# Patient Record
Sex: MALE | Race: WHITE
[De-identification: names, ages, dates, MRNs, and addresses within clinical notes are randomized per-mention and may not be internally consistent; named-entity substitution may affect disease eponyms.]

---

## 2018-08-27 NOTE — ED
Throat Pain/Nasal Congestion





- HPI Summary


HPI Summary: 


77-year-old male presents with a week's worth of right-sided jaw pain.  He 

states that the pain is worst when he chews.   He has been chewing more 

frequently. He states the pain seemed to radiate superficially around his ear.  

He denies any headache.  No dizziness.  No chest pain or shortness breath.  No 

weakness or numbness into his arms.  Denies any midline neck pain.  No injury.  

Pain is not worse with head movement.  He states that popping or locking in his 

jaw.  He did see his dentist last week for dental cleaning.  He denies any 

dental pain.  He does have follow-up with the dentist next week.  He has been 

taking Tylenol for his pain.  He states pain is worse when he talks.  Denies 

any change in vision or hearing.








- History of Current Complaint


Chief Complaint: EDNeckComplaint


Time Seen by Provider: 08/27/18 15:03





- Allergies/Home Medications


Allergies/Adverse Reactions: 


 Allergies











Allergy/AdvReac Type Severity Reaction Status Date / Time


 


No Known Allergies Allergy   Verified 08/27/18 15:37











Home Medications: 


 Home Medications





Acetaminophen TAB* [Tylenol TAB*] 325 mg PO Q4H PRN 08/27/18 [History Confirmed 

08/27/18]


Aspirin 81 mg CHEW TAB* [Aspirin Low Dose TAB*] 81 mg PO DAILY 08/27/18 [

History Confirmed 08/27/18]


Tamsulosin CAP* [Flomax CAP*] 0.4 mg PO DAILY 08/27/18 [History Confirmed 08/27/ 18]











PMH/Surg Hx/FS Hx/Imm Hx


Endocrine/Hematology History: 


   Denies: Hx Anticoagulant Therapy


Cardiovascular History: 


   Denies: Hx Hypertension





- Immunization History


Immunizations Up to Date: Yes


Infectious Disease History: No


Infectious Disease History: 


   Denies: Traveled Outside the [place] in Last 30 Days





- Family History


Known Family History: Positive: Cardiac Disease





- Social History


Alcohol Use: None


Substance Use Type: Reports: None


Smoking Status (MU): Former Smoker





Review of Systems


Negative: Fever


Positive: Other - right side jaw pain


Negative: Chest Pain


Negative: Shortness Of Breath


All Other Systems Reviewed And Are Negative: Yes





Physical Exam


Triage Information Reviewed: Yes


Vital Signs On Initial Exam: 


 Initial Vitals











Temp Pulse Resp BP Pulse Ox


 


 98.3 F   89   20   128/110   97 


 


 08/27/18 13:59  08/27/18 13:59  08/27/18 13:59  08/27/18 13:59  08/27/18 13:59











Vital Signs Reviewed: Yes


Appearance: Positive: Well-Appearing


Skin: Positive: Warm, Dry


Head/Face: Positive: Normal Head/Face Inspection, TMJ Tenderness - right.  

Negative: Temporal Artery Tenderness, Scalp


Eyes: Positive: Normal, EOMI, REVA, Conjunctiva Clear


ENT: Positive: Normal ENT inspection, Pharynx normal, Nasal drainage


Dental: Positive: Gross Decay/Caries @ - throughout


Neck: Positive: Supple, Nontender, No Lymphadenopathy, Other: - tenderness 

greatest over TMJ when opens jaw and feel popping present


Respiratory/Lung Sounds: Positive: Clear to Auscultation, Breath Sounds Present


Cardiovascular: Positive: Normal, RRR


Musculoskeletal: Positive: Strength/ROM Intact - neck and upper extremities, 

Other - good  strength


Neurological: Positive: Normal, Reflexes Intact - biceps


Psychiatric: Positive: Normal





Diagnostics





- Vital Signs


 Vital Signs











  Temp Pulse Resp BP Pulse Ox


 


 08/27/18 13:59  98.3 F  89  20  128/110  97














- Laboratory


Lab Statement: Any lab studies that have been ordered have been reviewed, and 

results considered in the medical decision making process.





EENT Course/Dx





- Course


Course Of Treatment: 77-year-old male presents with a week's worth of right-

sided jaw pain.  He states that the pain is worst when he chews.   He has been 

chewing more frequently. He states the pain seemed to radiate superficially 

around his ear.  He denies any headache.  No dizziness.  No chest pain or 

shortness breath.  No weakness or numbness into his arms.  Denies any midline 

neck pain.  No injury.  Pain is not worse with head movement.  He states that 

popping or locking in his jaw.  He did see his dentist last week for dental 

cleaning.  He denies any dental pain.  He does have follow-up with the dentist 

next week.  He has been taking Tylenol for his pain.  He states pain is worse 

when he talks.  Denies any change in vision or hearing.  On exam has no 

tenderness over the temporal artery.  Normal neuro exam.  Tenderness over TMJ.  

Pain with movement of jaw.  No focal deficits.  Do not suspect dissection or 

temporal arterities.  Belly pain is likely due to TMJ.  We'll have follow-up 

with dentist and primary.  Patient understands and agrees with plan.





- Differential Diagnoses


Differential Diagnoses: Temporal Arteritis, TMJ Syndrome, Other - disecction





- Diagnoses


Provider Diagnoses: 


 Jaw pain








Discharge





- Sign-Out/Discharge


Documenting (check all that apply): Patient Departure





- Discharge Plan


Condition: Good


Disposition: HOME


Patient Education Materials:  Temporomandibular Disorder (ED)


Referrals: 


Bruce Woods MD [Primary Care Provider] - 


Additional Instructions: 


eat softer foods


Take acetaminophen every 6 hours as needed for pain


Follow up with dentist as scheduled


Follow up with primary within 5 days as blood pressure is elevated at this visit


Return to ED if develop any new or worsening symptoms





- Billing Disposition and Condition


Condition: GOOD


Disposition: Home

## 2018-09-08 NOTE — RAD
Indication: Right facial droop.



Contrast: Administered 80.1 ml of OMNIPAQUE 350 mg/ml



CTA of the neck and head was performed after IV contrast administration. Coronal and

sagittal reconstructed images were obtained.



The great vessel and aortic arch demonstrates a common origin of left common carotid

artery and innominate artery. The common carotid artery bilaterally are grossly

unremarkable. Minimal plaque is noted at the origin of the left internal carotid arteries.

The right internal carotid artery demonstrates no significant plaque. No evidence of

carotid artery dissection is noted.



Vertebral arteries are patent. Dominant left vertebral artery is noted.



Intracranial circulation demonstrates no evidence of branch occlusion. No evidence of

aneurysmal dilatation is noted.



Vertebral artery, basilar artery and posterior cerebral arteries demonstrate no evidence

of branch occlusion or aneurysmal dilatation.



The visualized soft tissues of the neck demonstrates no evidence of abnormal adenopathy.

Submandibular glands are grossly unremarkable. No focal masses are identified.



IMPRESSION: Atherosclerosis of the origin of the left internal carotid artery. No

significant carotid artery stenosis is noted. No evidence of carotid artery dissection is

noted.



Intracranial circulation demonstrates no evidence of aneurysmal dilatation or branch

occlusion.
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
## 2018-09-08 NOTE — ED
Neurological HPI





- HPI Summary


HPI Summary: 


This patient is a 77 year old M presenting to [facility] with a chief complaint of a 

neurological deficit since 30 hours ago. Patient reports right sided jaw pain (

10 days ago), neck pain, right facial droop (30 hours ago), slurred speech (

yesterday), and difficulty putting a spoon in his mouth (yesterday). Patient 

denies difficulty moving his right arm.





- History of Current Complaint


Chief Complaint: EDNeurologicalDeficit


Stated Complaint: RT SIDE FACIAL NUMBNESS


Time Seen by Provider: 09/08/18 12:04


Hx Obtained From: Patient


Onset/Duration: Sudden Onset, Started days ago - 30 hours ago, Still Present


Neurological Deficit Location: Generalized - Loss of coordination, Facial - 

Facial droop


Pain Intensity: 0


Associated Signs and Symptoms: Positive: Pain - Right sided jaw pain and neck 

pain, Impaired Speech - Slurred speech





- Allergy/Home Medications


Allergies/Adverse Reactions: 


 Allergies











Allergy/AdvReac Type Severity Reaction Status Date / Time


 


No Known Allergies Allergy   Verified 09/08/18 12:20











Home Medications: 


 Home Medications





Finasteride TAB* [Proscar TAB*] 5 mg PO DAILY 09/08/18 [History Confirmed 09/08/ 18]


Gabapentin 300 mg PO TID 09/08/18 [History Confirmed 09/08/18]











PMH/Surg Hx/FS Hx/Imm Hx


Endocrine/Hematology History: 


   Denies: Hx Anticoagulant Therapy


Cardiovascular History: 


   Denies: Hx Hypertension


Infectious Disease History: No


Infectious Disease History: 


   Denies: Traveled Outside the [place] in Last 30 Days





- Family History


Known Family History: Positive: Cardiac Disease





- Social History


Occupation: Retired


Lives: With Family


Alcohol Use: None


Substance Use Type: Reports: None


Smoking Status (MU): Former Smoker





Review of Systems


Positive: Other - Right sided jaw pain and neck pain. 


Neurological: Other - Right facial droop and difficulty 


Positive: Slurred Speech


All Other Systems Reviewed And Are Negative: Yes





Physical Exam





- Summary


Physical Exam Summary: 


General: well-appearing, no pain distress


Skin: warm, color reflects adequate perfusion, dry, No rash. 


Head: normal. Right facial droop which includes the forehead, and his right 

eyelid drifts open before the left eyelid and closes after the left eyelid. He 

has no sensation deficit on either side of the face. He has mild tenderness to 

palpation at the angle of the jaw, at the TMJ, in the right ear canal and right 

parietal and occipital area. 


Eyes: EOMI, REVA. Blind in right eye (chronic problem). 


ENT: normal, TM is normal


Neck: supple, nontender


Respiratory: CTA, breath sounds present


Cardiovascular: RRR


Abdomen: soft, nontender


Bowel: present


Musculoskeletal: normal, strength/ROM intact


Neurological: sensory/motor intact, A&O x3, GCS 15, No difficulty with speech.


Psychological: affect/mood appropriate


Triage Information Reviewed: Yes


Vital Signs On Initial Exam: 


 Initial Vitals











Temp Pulse Resp BP Pulse Ox


 


 97 F   88   16   172/121   97 


 


 09/08/18 11:53  09/08/18 11:53  09/08/18 11:53  09/08/18 11:53  09/08/18 11:53











Vital Signs Reviewed: Yes





Diagnostics





- Vital Signs


 Vital Signs











  Temp Pulse Resp BP Pulse Ox


 


 09/08/18 11:53  97 F  88  16  172/121  97














- Laboratory


Result Diagrams: 


 09/08/18 12:26





 09/08/18 12:26


Lab Statement: Any lab studies that have been ordered have been reviewed, and 

results considered in the medical decision making process.





- Radiology


  ** Chest X-Ray


Radiology Interpretation Completed By: Radiologist - 13:12. NO ACTIVE 

CARDIOPULMONARY DISEASE IS NOTED. ED Physician has reviewed this imaging report.





- CT


  ** Brain CT


CT Interpretation Completed By: Radiologist - 12:52. Central and cortical 

atrophy. No intracranial mass or hemorrhage is noted. ED Physician has reviewed 

this imaging report.





  ** Head CTA


CT Interpretation Completed By: Radiologist - 17:11. Atherosclerosis of the 

origin of the left internal carotid artery. No significant carotid artery 

stenosis is noted. No evidence of carotid artery dissection is noted. 

Intracranial circulation demonstrates no evidence of aneurysmal dilatation or 

branch occlusion. ED Physician has reviewed this imaging report.





- EKG


  ** 12:38


Cardiac Rate: NL - 90 BPM


EKG Rhythm: Sinus Rhythm


ST Segment: Normal


Ectopy: None





Course/Dx





- Course


Course Of Treatment: CONSULTED DR YUN, NEUROLOGY, WHO SAW THE PATIENT IN 

THE ED.  ADMIT HOSPITALIST.





- Diagnoses


Provider Diagnoses: 


 Left-sided Bell's palsy, Headache, Gait difficulty








- Physician Notifications


Discussed Care Of Patient With: Kayley Yun - Neurology


Time Discussed With Above Provider: 13:31


Instructed by Provider To: MD Will See In ED





Discharge





- Sign-Out/Discharge


Documenting (check all that apply): Patient Departure


All imaging exams completed and their final reports reviewed: Yes





- Discharge Plan


Condition: Stable


Disposition: ADMITTED TO [facility]





- Billing Disposition and Condition


Condition: STABLE


Disposition: Admitted to [facility]





- Attestation Statements


Document Initiated by Scribe: Yes


Documenting Scribe: Fadi Urbina


Provider For Whom Scribe is Documenting (Include Credential): Jair Hudson


Scribe Attestation: 


Fadi NUNO, scribed for Jair Hudson on 09/08/18 at 2120. 


Scribe Documentation Reviewed: Yes


Provider Attestation: 


The documentation as recorded by the Fadi pa accurately reflects the 

service I personally performed and the decisions made by me, Jair Hudson





Consult


Consult: 


15:27. Informed Laney Freeman MD, hospitalist, of the patient's symptoms 

and Dr. Yun's interpretation of the case. Dr. Freeman will admit the patient.
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
## 2018-09-08 NOTE — RAD
Indication: Facial droop.



CT of the brain performed without IV contrast.



Ventriculomegaly consistent with central atrophy is noted. No midline shift is noted.

Prominent extra-axial spaces are noted. There is no evidence of intracranial mass or

hemorrhage. No other high or low density lesions are identified.



Mastoid air cells and paranasal sinuses are otherwise unremarkable.



IMPRESSION: Central and cortical atrophy. No intracranial mass or hemorrhage is noted.

## 2018-09-08 NOTE — RAD
Indication: Right facial injury.



Single frontal view of the chest performed at 1245 hours was reviewed.



No prior study is available for comparison.



No mediastinal shift is noted. Heart is of normal size and configuration. Lung fields

appear clear.



IMPRESSION: NO ACTIVE CARDIOPULMONARY DISEASE IS NOTED.

## 2018-09-09 NOTE — PN
CC:  Dr. Bruce Woods *

 

PROGRESS NOTE:

 

DATE OF SERVICE:  09/09/18.

 

HISTORY:  Overnight Hernando Hernández noted that his headaches did improve with 
Tylenol and aspirin last night.  It is starting to return.  He thought like his 
face was firming up a little bit, but then this morning continues to notice 
difficulty with weakness.  He denies any new symptoms of change of vision, 
numbness, weakness of arms or legs.

 

MEDICATIONS:  Include:

1.  Acetaminophen 650 mg p.o. q.6 hours p.r.n. pain.

2.  Aspirin 81 mg p.o. daily.

3.  Gabapentin 900 mg p.o. t.i.d.

4.  Heparin 5000 units subcu q.8 hours.

5.  Morphine 2 mg IV q.2 hours.

6.  Hydralazine 10 mg IV q.2 hours p.r.n. systolic blood pressure greater than 
170.

7.  Amlodipine 10 mg p.o. daily.

8.  Vitamin B12 injections 1000 mcg IM daily.

9.  Proscar 5 mg p.o. daily.

 

ALLERGIES:  He has no known drug allergies.

 

PHYSICAL EXAMINATION:  On examination, his temperature was 98 degrees 
Fahrenheit. He had regular cardiac rhythm with a pulse of 99, respiratory rate 
was 16, his blood pressure was 142/98, last night it was as high as 168/111, 
and yesterday afternoon in the ER up to 184/120.  He was saturating 98% on room 
air.  He had a regular cardiac rhythm.  His lungs were clear to auscultation.  
There was no evidence of rash.  He was awake, alert, and where he was located 
was tired after at night with some decrease in sleep.  Had normal language 
function.  He is blind in his right eye and his left eye had full extraocular 
movements, full fields to confrontation.  He continues to have a right 
peripheral 7th nerve palsy in that his right forehead and face and eye closure 
all are affected, but he is able to completely close his right eye.  There was 
a right pronator drift.  He otherwise was strong in his upper and lower 
extremities with good finger-to-nose and heel-to- shin movements.  He was able 
to stand, walk with a walker with his feet 1 foot apart.

 

LABORATORY DATA:  Data includes troponin of 0.01, C-reactive protein 3.83, 
lipid profile with total cholesterol 150, LDL 94, triglycerides 131, HDL 29.8, 
his vitamin B12 level was 72, folate was 13.7, TSH 1.04.  A CTA of the brain 
and neck showed no evidence of dissection, occlusion or aneurysm.

 

IMPRESSION AND PLAN:  An 78-year-old gentleman with history of almost 1 month 
of progressive neck and head pain, now developing right facial weakness with 
further decline in gait.  Differential diagnosis is wide.  Given the unusual 
presentation of facial weakness over a long duration one must question 
underlying infection such as Lyme disease or underlying injury, which could 
have contributed.  MRI of the brain is pending for tomorrow.  Ischemic lesion 
is on differential diagnosis given the acute decline, which led to admission 
yesterday.  An MRI will give further information.  Echocardiogram is pending.  
He is on telemetry.  He is on aspirin and further evaluation and treatment of 
stroke will depend on results of studies.

 

In addition, he has been found to have profound decrease in B12 and is 
undergoing further workup with methylmalonic acid level, intrinsic factor.  He 
has been started on B12 supplementation.  He does admit his diet is poor.  This 
may be contributing to some of the cognitive decline and balance decline.

 

In the differential diagnosis was vasculitis, his sedimentation rate and C-
reactive protein were within normal limits making this less likely.  There was 
no evidence of vasculitis was seen on CTA.  His SABRINA is pending.

 

Possibility of infection contributing is raised and urinalysis is being sent as 
well as Lyme.

 

Overall, I am concerned about his instability and ability to function alone in 
the setting of cognitive change, decreased balance, weakness.  Accordingly, he 
needs not only physical therapy and occupational therapy evaluation, but also 
social work involvement.

 

Differential diagnosis does include an idiopathic Bell's palsy, however, it is 
an unusual presentation.  At this point, he would not be a candidate for 
antivirals given the length of symptoms.  We have held up on steroids given 
other differential diagnosis, which could result in steroids being 
contraindicated.  



Over 30 minutes was spent in direct face-to-face the patient's care.  We will 
have the Neurology team continue to follow with you.

 

 [id number]/[facility] #: [id number]

REBEKAH

## 2018-09-09 NOTE — ECHO
Patient:      DYANA CISSE

Med Rec#:     [id number]            :          1940          

Date:         2018            Age:          78y                 

Account#:     [id number]          Height:       185.4 cm / 73.0 in

Accession#:   [id number]           Weight:       98 kg / 216.0 lbs

Sex:          M                     BSA:          2.22

Room#:        433                   

Admit Date#:  2018          

Type:         Inpatient

 

Referring:    Paloma Meeks NP

Reading:      Estela Hobbs MD

Sonographer:  Mili Olvera RN RDCS

CC:           Bruce Woods MD

______________________________________________________________________

 

Transthoracic Echocardiogram

 

Indication:

CVA

BP:           142/98

HR:           78

Rhythm:       NSR with PVCs

 

Findings     

History:

MI, BPH 

 

Technical Comments:

The study is technically limited due to poor acoustic windows.  

 

Left Ventricle:

The left ventricle is not well visualized. The left ventricular chamber

size is normal. Septal wall hypertrophy is observed.grossly normal wall

motion. The estimated ejection fraction is 45-50%.  There is an E to A

reversal in the mitral valve flow pattern suggestive of diastolic

dysfunction. 

 

Left Atrium:

The left atrial chamber size is normal. 

 

Right Ventricle:

The right ventricle wall thickness is mildly increased. The right

ventricular cavity size is normal. The right ventricular global systolic

function is low normal. 

 

Right Atrium:

The right atrium is slightly dilated.  A patent foramen ovale is not

demonstrated by color Doppler. The imaging for the bubble study was

suboptimal and it cannot be interpreted. 

 

Aortic Valve:

The aortic valve leaflets are mildly thickened. There is no evidence of

aortic regurgitation. There is no evidence of aortic stenosis. 

 

Mitral Valve:

The mitral valve leaflets are mildly thickened. There is no evidence of

mitral regurgitation. There is no evidence of mitral stenosis. 

 

Tricuspid Valve:

The tricuspid valve structure is not well visualized. There is no

evidence of tricuspid valve regurgitation. There is no tricuspid

stenosis. 

 

Pulmonic Valve:

The pulmonic valve structure is not well visualized. 

 

Pericardium:

There is no significant pericardial effusion. A pericardial fat pad is

visualized. 

 

Aorta:

There is mild dilatation of the ascending aorta.at 3.7 cm. There is no

dilatation of the aortic arch. There is moderate dilatation of the

aortic root.at 4.2 cm. 

 

Pulmonary Artery:

The main pulmonary artery is not well visualized. 

 

Venous:

The inferior vena cava appears normal in size. There is a greater than

50% respiratory change in the inferior vena cava dimension. 

 

Contrast:

Normal saline was used as contrast for the bubble study.  Images 101 and

102. A total of 4 ml of diluted Definity was given IV to try to enhance

endocardial border definition. 

 

Conclusions

Septal wall hypertrophy is observed.grossly normal wall motion.

The estimated ejection fraction is 50%. 

There is an E to A reversal in the mitral valve flow pattern suggestive

of diastolic dysfunction.

The right ventricle wall thickness is mildly increased.

The right ventricular global systolic function is low normal.

The imaging for the bubble study was suboptimal and it cannot be

interpreted.

The aortic valve leaflets are mildly thickened with normal function.

There is mild dilatation of the ascending aorta.at 3.7 cm.

No prior echo to compare.

Consider TAZ if clinically indicated to evaluate for cardiopulmonary

shuting or other cardioembolic source.

 

Measurements     

Name                    Value         Normal Range            

RVDdMajor (2D)          3.1 cm        (2.2 - 4.4)             

RVAW (2D)               0.7 cm        (0.2 - 0.5)             

RAd ISD 4CH             5.2 cm        (3.4 - 4.9)             

RA (A4C)W               3.8 cm        (2.9 - 4.6)             

IVSd (2D)               1.5 cm        (0.6 - 1)               

LVPWd (2D)              1 cm          (0.6 - 1)               

LVIDd (2D)              4 cm          (3.6 - 5.4)             

LVIDs (2D)              3.5 cm        -                        

LV FS (2D)              14 %          (25 - 45)               

Aortic Annulus          2.5 cm        (1.4 - 2.6)             

Ao root diameter (2D)   4.2 cm        (2.1 - 3.5)             

Ascending Ao            3.7 cm        (2.1 - 3.4)             

Aortic arch             2.7 cm        (1.8 - 3.4)             

LA dimension (AP) 2D    3.4 cm        (2.3 - 3.8)             

LAd ISD 4CH             5.3 cm        (2.9 - 5.3)             

LA ISD 4CH W            4.3 cm        (2.5 - 4.5)             

 

Name                    Value         Normal Range            

LA ESV SP 4CH (A/L)     51 ml         -                        

LA ESV SP 2CH (A/L)     53 ml         -                        

LA ESV BP (A/L)         53 ml         -                        

LA ESV BP (A/L) index   23.6 ml/m2    -                        

LA ESV SP 4CH (MOD)     49 ml         -                        

LA ESV SP 2CH (MOD)     50 ml         -                        

 

Name                    Value         Normal Range            

MV E-wave Vmax          0.4 m/sec     -                        

MV deceleration time    143 msec      -                        

MV A-wave Vmax          0.92 m/sec    -                        

MV E:A ratio            0.44 ratio    -                        

LV septal e' Vmax       0.05 m/sec    -                        

LV lateral e' Vmax      0.07 m/sec    -                        

LV E:e' septal ratio    8 ratio       -                        

LV E:e' lateral ratio   5.7 ratio     -                        

 

Name                    Value         Normal Range            

AV Vmax                 1.1 m/sec     -                        

AV VTI                  20.3 cm       -                        

AV peak gradient        4.7 mmHg      -                        

AV mean gradient        2.8 mmHg      -                        

LVOT Vmax               0.79 m/sec    -                        

LVOT VTI                19.1 cm       -                        

LVOT peak gradient      2.5 mmHg      -                        

LVOT mean gradient      1.7 mmHg      -                        

RAKESH Vmax                0.72 m/sec    -                        

 

Name                    Value         Normal Range            

IVC diameter            1.4 cm        -                        

 

Name                    Value         Normal Range            

PV Vmax                 0.61 m/sec    -                        

 

Electronically signed by: Estela Hobbs MD on 2018 14:33:54

## 2018-09-09 NOTE — PN
Subjective


Date of Service: 09/09/18


Interval History: 





Pt noted progressive problems with unsteady gait for at least a month. R facial 

drop had been present x 1 week. Today pt denies pain





Objective


Active Medications: 








Acetaminophen (Tylenol Tab*)  650 mg PO Q6H PRN


   PRN Reason: PAIN


   Last Admin: 09/09/18 09:45 Dose:  650 mg


Amlodipine Besylate (Norvasc Tab*)  10 mg PO DAILY [facility]


   Last Admin: 09/09/18 09:46 Dose:  10 mg


Aspirin (Aspirin 81 Mg Chew Tab*)  81 mg PO DAILY [facility]


   Last Admin: 09/09/18 09:46 Dose:  81 mg


Cyanocobalamin (Vitamin B12 Inj *)  1,000 mcg IM DAILY [facility]


   Stop: 09/14/18 23:59


   Last Admin: 09/09/18 09:46 Dose:  1,000 mcg


Finasteride (Proscar Tab*)  5 mg PO DAILY [facility]


   Last Admin: 09/09/18 09:45 Dose:  5 mg


Gabapentin (Neurontin Cap(*))  900 mg PO TID [facility]


   Last Admin: 09/09/18 09:45 Dose:  900 mg


Heparin Sodium (Porcine) (Heparin Vial(*))  5,000 units SUBCUT Q8HR [facility]


   Last Admin: 09/09/18 05:50 Dose:  5,000 units


Hydralazine HCl (Apresoline Iv*)  10 mg IV SLOW PU Q2H PRN


   PRN Reason: SYSTOLIC BP GREATER THAN:


Morphine Sulfate (Morphine Inj ((Syringe))*)  2 mg IV Q2H PRN


   PRN Reason: PAIN


   Last Admin: 09/09/18 01:05 Dose:  2 mg


Nystatin (Nystatin Top Powder*)  1 applic TOPICAL TID [facility]








 Vital Signs - 8 hr











  09/09/18 09/09/18 09/09/18





  07:21 09:45 11:05


 


Temperature 97.9 F  98.1 F


 


Pulse Rate 107  99


 


Respiratory 20 16 16





Rate   


 


Blood Pressure 164/98  133/101





(mmHg)   


 


O2 Sat by Pulse 97  96





Oximetry   














  09/09/18





  11:34


 


Temperature 


 


Pulse Rate 


 


Respiratory 16





Rate 


 


Blood Pressure 





(mmHg) 


 


O2 Sat by Pulse 





Oximetry 











Oxygen Devices in Use Now: None


Appearance: 77 yo M in nAD, aAOx3


Eyes: No Scleral Icterus, - - R eye cloudy -chronic


Ears/Nose/Mouth/Throat: NL Teeth, Lips, Gums, Mucous Membranes Moist


Neck: NL Appearance and Movements; NL JVP, Trachea Midline


Respiratory: Symmetrical Chest Expansion and Respiratory Effort, Clear to 

Auscultation


Cardiovascular: NL Sounds; No Murmurs; No JVD, RRR


Abdominal: NL Sounds; No Tenderness; No Distention


Lymphatic: No Cervical Adenopathy


Extremities: No Edema, No Clubbing, Cyanosis


Skin: No Rash or Ulcers, No Nodules or Sclerosis


Neurological: Alert and Oriented x 3, - - R sided Bell's palsy, motor 5/5 b/l, 

wide unsteady gait


Result Diagrams: 


 09/08/18 12:26





 09/08/18 12:26





Assess/Plan/Problems-Billing


Assessment: 77 yo M with h/o indwelling Bagley x 1 month and worsening of 

unsteadiness noted to have R facial droop and R neck pain on presentation to ED 

that had been ongoing for at lest a week prior 











- Patient Problems


(1) Facial droop


Comment: appears to be due to Bell's palsy


Lyme testing pending


MRI ordered


due to onset of symptoms over a week ago there would be no clear benefit of tx 

with steroids or antivirals.


D/w Dr. Powers.


cont PT   





(2) Unsteady gait


Comment: Likely neuro symptom of severe Vit B12 defficiency


will start daily Vit B12 injections


cont PT


Likely diet related, but will check MMA level and anti-intrinsic factor 

antibody to r/o pernicious anemia   





(3) Bagley catheter in place


Comment: h/o retention x 1 month , cont finasteride   





(4) Vitamin B 12 deficiency


Comment: severe with neuro symptoms.


Tx as above.


Cont PT/OT -will gustavoley need STR   





(5) HTN (hypertension)


Comment: started on Norvasc   





(6) DVT prophylaxis


Comment: HSQ   


Status and Disposition: 


inpatient

## 2018-09-10 NOTE — RAD
Indication: Right facial weakness.



Sagittal and axial T1, axial T2, FLAIR, diffusion and susceptibility weighted images of

the brain were obtained.



Ventricular structures are midline. No midline shift is noted. There is central and

cortical atrophy noted. There is no restriction of diffusion on diffusion-weighted images.

The brainstem and posterior fossa are otherwise unremarkable.



FLAIR images demonstrate likely chronic microvascular change involving the left basal

ganglia and right occipital white matter. No other intracranial mass or hemorrhage is

noted. No evidence of susceptibility artifact is noted. The orbits and paranasal sinuses

are otherwise unremarkable.



IMPRESSION: Central and cortical atrophy with mild chronic ischemic White matter change.

No evidence of restriction of diffusion is noted. No acute changes are noted.

## 2018-09-10 NOTE — PN
NEUROLOGICAL FOLLOWUP NOTE:

 

DATE OF VISIT:  09/10/18

 

HISTORY:  This is an 78-year-old man being evaluated for his Bell's palsy that 
has been on for at least 10 days' time as well as his gait disorder.  It is 
unclear how long this has been going on, but 2 months ago his sister and family 
came to visit and he was walking well and went out to dinner with them.  Two 
weeks ago, he seemed unsteady in his apartment.  It was unclear how bad he was 
in the past.  In the hospital, he is in danger of falling.  It is unclear how 
long this has been going on for, but it is relatively recent development he has 
had.  He is blind in his right eye.  Longstanding, he has had some right jaw 
pain that is predates the apparent Bell's palsy.

 

MEDICATIONS:  Include:

1.  Lopressor 25 mg q.12 hours.

2.  Gabapentin 900 t.i.d.

3.  He is now on B12 1000 mcg IM daily at this point.

4.  Ceftriaxone.

5.  Aspirin 81 mg daily.

 

He has no other complaints.

 

PHYSICAL EXAMINATION:  Temperature 97.9, pulse 98, respiratory rate 16, blood 
pressure 153/107.  He is alert and oriented with normal speech and 
comprehension. He has a right Bell's palsy.  Vision, he is blind in the right 
eye that is chronic. He has normal eye findings on the left.  Finger to nose 
was intact.  He was quite unsteady and had a positive Romberg and a wide-based 
stance.  It is hard for him to walk and he could not walk without assistance.  
He had trace weakness throughout. He had a decrease vibration in his toes.  
Reflexes were 1+ in his arms, 1+ in his legs.  Ankle jerks were detectable but 
diminished.

 

DIAGNOSTIC STUDIES:  Reviewed his MRI scan, which showed diffuse atrophy and 
some mild white matter changes.  He had B12 level of 72.  His Lyme titer is 
still pending.  His sed rate is 14. Normal TSH and thyroid. He had 2+ leukocyte 
esterase.

 

Normal INR.

 

ASSESSMENT/PLAN:  I discussed with Dr. Freeman that it is unclear if his gait 
disturbance and balance issues are directly related to his Bell's palsy.  Once 
possible connection would be if he had something like De Jesus Patel variant to 
[place], however, his symptoms had been going on little bit long for 
this to be likely, but it is still.  The history is somewhat vague and is hard 
to know.  I will be getting nerve conduction study tomorrow a.m. and then 
further recommendations would follow that.  He does have diffuse atrophy on his 
MRI scan and some of his gait problems could be chronic, it is hard to know.  
Again, from his history, some of it could be centrally based.  Lyme studies are 
pending as well and we will follow up on that in addition.

 

Thank you for sharing his care.

 

 [id number]/CPS #: [id number]

REBEKAH

## 2018-09-10 NOTE — PN
Subjective


Date of Service: 09/10/18


Interval History: 





Pt feels well, no new complaints. continues to have R facial palsy. seen with 

his sister who is visiting


Noted to have asymptomatic 20 beats of V. tach today





Objective


Active Medications: 








Acetaminophen (Tylenol Tab*)  650 mg PO Q6H PRN


   PRN Reason: PAIN


   Last Admin: 09/10/18 07:36 Dose:  650 mg


Aspirin (Aspirin 81 Mg Chew Tab*)  81 mg PO DAILY [facility]


   Last Admin: 09/10/18 07:37 Dose:  81 mg


Cyanocobalamin (Vitamin B12 Inj *)  1,000 mcg IM DAILY [facility]


   Stop: 09/14/18 23:59


   Last Admin: 09/10/18 07:37 Dose:  1,000 mcg


Finasteride (Proscar Tab*)  5 mg PO DAILY [facility]


   Last Admin: 09/10/18 07:37 Dose:  5 mg


Gabapentin (Neurontin Cap(*))  900 mg PO TID [facility]


   Last Admin: 09/10/18 13:31 Dose:  900 mg


Heparin Sodium (Porcine) (Heparin Vial(*))  5,000 units SUBCUT Q8HR [facility]


   Last Admin: 09/10/18 13:32 Dose:  5,000 units


Hydralazine HCl (Apresoline Iv*)  10 mg IV SLOW PU Q2H PRN


   PRN Reason: SYSTOLIC BP GREATER THAN:


Ceftriaxone Sodium 1 gm/ (Sodium Chloride)  50 mls @ 200 mls/hr IVPB Q24H [facility]


   Last Admin: 09/09/18 18:27 Dose:  200 mls/hr


Metoprolol Tartrate (Lopressor Tab*)  25 mg PO Q12HR [facility]


Morphine Sulfate (Morphine Inj ((Syringe))*)  2 mg IV Q2H PRN


   PRN Reason: PAIN


   Last Admin: 09/09/18 01:05 Dose:  2 mg


Nystatin (Nystatin Top Powder*)  1 applic TOPICAL TID [facility]


   Last Admin: 09/10/18 13:32 Dose:  1 applic








 Vital Signs - 8 hr











  09/10/18 09/10/18 09/10/18





  07:34 07:35 08:00


 


Temperature 97.9 F  


 


Pulse Rate 98  


 


Respiratory 16 16 16





Rate   


 


Blood Pressure 153/107  





(mmHg)   


 


O2 Sat by Pulse 97  





Oximetry   














  09/10/18 09/10/18





  09:11 13:31


 


Temperature  


 


Pulse Rate  


 


Respiratory 16 16





Rate  


 


Blood Pressure  





(mmHg)  


 


O2 Sat by Pulse  





Oximetry  











Oxygen Devices in Use Now: None


Appearance: 79 yo M in nAD, AAOx3


Eyes: No Scleral Icterus, - - R cornea cludy


Ears/Nose/Mouth/Throat: NL Teeth, Lips, Gums, Mucous Membranes Moist


Neck: NL Appearance and Movements; NL JVP, Trachea Midline


Respiratory: Symmetrical Chest Expansion and Respiratory Effort, Clear to 

Auscultation


Cardiovascular: NL Sounds; No Murmurs; No JVD, RRR


Abdominal: NL Sounds; No Tenderness; No Distention, No Hepatosplenomegaly


Lymphatic: No Cervical Adenopathy


Extremities: No Edema, No Clubbing, Cyanosis


Skin: No Rash or Ulcers, No Nodules or Sclerosis


Neurological: Alert and Oriented x 3, - - R Hyde's palsy. Motor 5/5 b/l speech 

clear


Result Diagrams: 


 09/08/18 12:26





 09/10/18 06:29





Assess/Plan/Problems-Billing


Assessment: 79 yo M with h/o indwelling Bagley x 1 month and worsening of 

unsteadiness noted to have R facial droop and R neck pain on presentation to ED 

that had been ongoing for at lest a week prior 











- Patient Problems


(1) Facial droop


Comment: appears to be due to Bell's palsy


Lyme testing pending


MRI done , read pending


due to onset of symptoms over a week ago there would be no clear benefit of tx 

with steroids or antivirals.


D/w . EMG tomorrow


cont PT   





(2) Unsteady gait


Comment: Likely neuro symptom of severe Vit B12 defficiency


cont daily Vit B12 injections


cont PT


Likely diet related, but MMA level and anti-intrinsic factor antibody to r/o 

pernicious anemia pending   





(3) Bagley catheter in place


Comment: h/o retention x 1 month , cont finasteride   





(4) Vitamin B 12 deficiency


Comment: severe with neuro symptoms.


Tx as above.


Cont PT/OT -willneed STR   





(5) HTN (hypertension)


Comment: started on Norvasc, but continues to be tachycardic and now with one 

episode of V. tach. will start lopressor, d/c Norvasc. cont telem


Echo showed EF 45-50%   





(6) UTI (urinary tract infection)


Comment: cx pending


Ceftriaxone started


Bagley exchanged on 9/9/18   





(7) DVT prophylaxis


Comment: HSQ   


Status and Disposition: 


inpatient

## 2018-09-11 NOTE — CONS
CONSULTATION REPORT:

 

DATE OF CONSULT:  18

 

REQUESTING PHYSICIAN:  Dr. Freeman.

 

CONSULTING SERVICE:  Infectious Disease.

 

REASON FOR CONSULT:  Bell's palsy, aseptic meningitis.

 

IMPRESSION:

1.  A week of right peripheral cranial nerve VII palsy as well as spinal fluid 
profile that includes 90 white cells, mostly lymphocytes, protein 187, normal 
glucose.  He is constitutionally well, though for a couple of months this 
summer had decreased appetite and malaise.  He has had some gait disturbance as 
well and evidence of neuropathy.  His Lyme serology is positive.  He could have 
a cranial nerve VII palsy due to Lyme as well as a Lyme meningitis.  It is a 
little hard to get the time course on his systemic symptoms early in the summer 
to tie those in, but it is possible.  Other infectious considerations with his 
gait disturbance and CSF findings include neurosyphilis.

2.  Vitamin B12 deficiency with macrocytosis.

3.  Urinary retention and indwelling Bagley for the last 2 months.

 

RECOMMENDATIONS:

1.  Doxycycline 100 mg by mouth twice a day to cover Lyme.  The Lyme ANTIONE is 
positive. The Western blot is pending.  In the blood, he did have a VDRL and 
the spinal fluid sent.

2.  I will add an HIV antibody.

 

HISTORY OF PRESENT ILLNESS:  This is an 78-year-old man, admitted with a right 
cranial nerve VII palsy.  He was seen by Neurology.  He was started on 
ceftriaxone for possibility of urinary tract infection.  The urinalysis had 
shown blood and leukocyte esterase.  The urine culture came back negative.  He 
has had an MRI of the brain that showed some central and cortical atrophy with 
mild chronic ischemic white matter changes that did not enhance.  A Lyme ANTIONE 
was positive, Western blot pending.  C-reactive protein is 3, and his vitamin 
B12 was found to be low.  He had a nerve conduction study that was abnormal and 
prompted a lumbar puncture with results as noted above.  He does spend some 
time outdoors this time of year including around his house in the lawn, does 
have a cat, which is an outdoor cat and does sleep with him.  He has had no 
travel.

 

PAST MEDICAL HISTORY:

1.  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

2.  Benign prostatic hypertrophy with urinary retention and Bagley catheter.

3.  History of MI.

4.  Chronic pain.

5.  Left cataract.

6.  Right femur fracture and fixation.

7.  Status post cholecystectomy.

8.  Status post appendectomy.

 

MEDICATIONS:

1.  Tylenol.

2.  Aspirin.

3.  Vitamin B12 injection.

4.  Ceftriaxone 1 g a day.

5.  Finasteride.

6.  Gabapentin.

7.  Heparin subcutaneous injection.

8.  Metoprolol.

 

ALLERGIES:  No known drug allergies.

 

SOCIAL HISTORY:  He lives on [address] by himself.  He has a pet 
cat.  He is retired from [employer].  No recent travel or sick contacts.

 

FAMILY HISTORY:  No recurrent infections.  His mother  of a heart attack in 
her 80s and father  in his 50s of a heart attack.

 

REVIEW OF SYSTEMS:  All negative except as noted above to a 14-point review of 
systems.

 

PHYSICAL EXAM:  Vital Signs:  Temperature is 36, heart rate 60, respiratory 
rate 18, blood pressure 130/88, oxygen saturation 100% on room air.  In general
, he is awake, not in distress.  Neurologic:  He has decreased right nasolabial 
fold and right facial weakness including in the forehead.  There is no lower 
extremity clonus.  HEENT:  There is no conjunctival hemorrhage.  There is 
slight right-sided conjunctivitis.  There is no oropharyngeal ulceration.  Neck 
is supple without mass.  Heart is regular rate and rhythm without murmurs, rubs
, or gallops.  Lungs are clear to auscultation bilaterally.  Abdomen:  Soft, 
nontender, nondistended. There are bowel sounds present.  Skin:  There is no 
rash or splinter hemorrhage. Musculoskeletal:  There is no spinal tenderness to 
palpation.  No joint synovitis.

 

LABORATORY DATA:  White blood cell count 7, hemoglobin 16, platelets 382. 
Creatinine is 0.8.  CRP 3.8.

 

Please see impressions and recommendations as outlined above, which I have 
discussed with Dr. Freeman.

 

Thanks for asking me to see Mr. Hernández in consultation.

 

 [id number]/CPS #: [id number]

[facility]

## 2018-09-11 NOTE — RAD
HISTORY: r/o encephalitis



COMPARISONS: September 10, 2018 



TECHNIQUE: The following sequences were obtained of the head: Sagittal, axial, and coronal

T1-weighted images after contrast enhancement with a gadolinium-based intravenous contrast

agent. 



FINDINGS: 

The study is read in conjunction with the MRI of September 10, 2018.



White matter changes noted on the previous examination do not enhance. There is no

abnormal enhancement.



IMPRESSION: 

THE WHITE MATTER CHANGES NOTED ON THE PREVIOUS EXAMINATION DO NOT ENHANCE. THERE IS NO

ABNORMAL ENHANCEMENT ON THE CURRENT EXAMINATION.

## 2018-09-11 NOTE — PN
Subjective


Date of Service: 09/11/18


Interval History: 





Pt feels well, c/o occasional pain in R face. Today planned for LP





Objective


Active Medications: 








Acetaminophen (Tylenol Tab*)  650 mg PO Q6H PRN


   PRN Reason: PAIN


   Last Admin: 09/11/18 08:12 Dose:  650 mg


Aspirin (Aspirin 81 Mg Chew Tab*)  81 mg PO DAILY [facility]


   Last Admin: 09/11/18 08:14 Dose:  81 mg


Cyanocobalamin (Vitamin B12 Inj *)  1,000 mcg IM DAILY [facility]


   Stop: 09/14/18 23:59


   Last Admin: 09/11/18 08:12 Dose:  1,000 mcg


Finasteride (Proscar Tab*)  5 mg PO DAILY [facility]


   Last Admin: 09/11/18 08:13 Dose:  5 mg


Gabapentin (Neurontin Cap(*))  900 mg PO TID [facility]


   Last Admin: 09/11/18 08:13 Dose:  900 mg


Hydralazine HCl (Apresoline Iv*)  10 mg IV SLOW PU Q2H PRN


   PRN Reason: SYSTOLIC BP GREATER THAN:


Ceftriaxone Sodium 1 gm/ (Sodium Chloride)  50 mls @ 200 mls/hr IVPB Q24H [facility]


   Last Admin: 09/10/18 17:37 Dose:  200 mls/hr


Metoprolol Tartrate (Lopressor Tab*)  25 mg PO Q12HR [facility]


   Last Admin: 09/11/18 08:13 Dose:  25 mg


Morphine Sulfate (Morphine Inj ((Syringe))*)  2 mg IV Q2H PRN


   PRN Reason: PAIN


   Last Admin: 09/11/18 06:48 Dose:  2 mg


Nystatin (Nystatin Top Powder*)  1 applic TOPICAL TID [facility]


   Last Admin: 09/11/18 08:16 Dose:  1 applic








 Vital Signs - 8 hr











  09/11/18 09/11/18 09/11/18





  06:48 07:52 08:13


 


Temperature  98.0 F 


 


Pulse Rate  73 


 


Respiratory 16 16 16





Rate   


 


Blood Pressure  152/95 





(mmHg)   


 


O2 Sat by Pulse  98 





Oximetry   














  09/11/18 09/11/18





  08:16 08:51


 


Temperature  


 


Pulse Rate  


 


Respiratory 16 16





Rate  


 


Blood Pressure  





(mmHg)  


 


O2 Sat by Pulse  





Oximetry  











Oxygen Devices in Use Now: None


Appearance: 77 yo M in nAD, AAOx3


Eyes: No Scleral Icterus, - - R cornea cloudy 


Ears/Nose/Mouth/Throat: NL Teeth, Lips, Gums, Mucous Membranes Moist


Neck: NL Appearance and Movements; NL JVP, Trachea Midline


Respiratory: Symmetrical Chest Expansion and Respiratory Effort, Clear to 

Auscultation


Cardiovascular: NL Sounds; No Murmurs; No JVD, RRR


Abdominal: NL Sounds; No Tenderness; No Distention


Lymphatic: No Cervical Adenopathy


Extremities: No Edema, No Clubbing, Cyanosis


Skin: No Rash or Ulcers, No Nodules or Sclerosis


Neurological: Alert and Oriented x 3, - - R Hyde's palsy unchanged


Result Diagrams: 


 09/08/18 12:26





 09/10/18 06:29


Microbiology and Other Data: 


 Microbiology











 09/09/18 11:00 Urine Culture - Final





 Urine 














Assess/Plan/Problems-Billing


Assessment: 77 yo M with h/o indwelling Bagley x 1 month and worsening of 

unsteadiness noted to have R facial droop and R neck pain on presentation to ED 

that had been ongoing for at lest a week prior 











- Patient Problems


(1) Facial droop


Comment: appears to be due to Bell's palsy


Lyme testing pending


MRI- no CVA


due to onset of symptoms over a week ago there would be no clear benefit of tx 

with steroids or antivirals.


D/w . EMG shows b/l LE's neuropathy that may be related to B12 deff, 

but LP recommended to r/o GBS.


cont PT   





(2) Unsteady gait


Comment: Likely neuro symptom of severe Vit B12 defficiency


cont daily Vit B12 injections


cont PT


Likely diet related, but MMA level and anti-intrinsic factor antibody to r/o 

pernicious anemia pending   





(3) Bagley catheter in place


Comment: h/o retention x 1 month , cont finasteride   





(4) Vitamin B 12 deficiency


Comment: severe with neuro symptoms.


Tx as above.


Cont PT/OT - approved for STR   





(5) HTN (hypertension)


Comment: cont  lopressor. One episode of 20 beats of V. tach on 9/9/18


Echo showed EF 45-50%   





(6) UTI (urinary tract infection)


Comment: cx  shwos mixed jason, possible Bagley contamination


will d/c Ceftriaxone


Bagley exchanged on 9/9/18   





(7) DVT prophylaxis


Comment: HSQ   


Status and Disposition: 


inpatient

## 2018-09-12 NOTE — PN
Subjective


Date of Service: 09/12/18


Interval History: 





Pt stated that intermittent R facial pain that was present 2 days ago resolved. 

Feels overall better and stronger





Objective


Active Medications: 








Acetaminophen (Tylenol Tab*)  650 mg PO Q6H PRN


   PRN Reason: PAIN


   Last Admin: 09/11/18 23:43 Dose:  650 mg


Aspirin (Aspirin 81 Mg Chew Tab*)  81 mg PO DAILY [facility]


   Last Admin: 09/12/18 08:00 Dose:  81 mg


Cyanocobalamin (Vitamin B12 Inj *)  1,000 mcg IM DAILY [facility]


   Stop: 09/14/18 23:59


   Last Admin: 09/12/18 08:01 Dose:  1,000 mcg


Doxycycline Hyclate (Vibramycin Cap(*))  100 mg PO BID [facility]


   Last Admin: 09/12/18 07:59 Dose:  100 mg


Finasteride (Proscar Tab*)  5 mg PO DAILY [facility]


   Last Admin: 09/12/18 08:00 Dose:  5 mg


Gabapentin (Neurontin Cap(*))  900 mg PO TID [facility]


   Last Admin: 09/12/18 14:24 Dose:  900 mg


Heparin Sodium (Porcine) (Heparin Vial(*))  5,000 units SUBCUT Q8HR [facility]


   Last Admin: 09/12/18 14:24 Dose:  5,000 units


Hydralazine HCl (Apresoline Iv*)  10 mg IV SLOW PU Q2H PRN


   PRN Reason: SYSTOLIC BP GREATER THAN:


Metoprolol Tartrate (Lopressor Tab*)  25 mg PO Q12HR [facility]


   Last Admin: 09/12/18 07:58 Dose:  25 mg


Morphine Sulfate (Morphine Inj ((Syringe))*)  2 mg IV Q2H PRN


   PRN Reason: PAIN


   Last Admin: 09/12/18 07:48 Dose:  2 mg


Nystatin (Nystatin Top Powder*)  1 applic TOPICAL TID [facility]


   Last Admin: 09/12/18 14:26 Dose:  Not Given








 Vital Signs - 8 hr











  09/12/18 09/12/18 09/12/18





  10:18 11:28 14:24


 


Temperature  97.6 F 


 


Pulse Rate  58 


 


Respiratory 16 16 16





Rate   


 


Blood Pressure  128/81 





(mmHg)   


 


O2 Sat by Pulse  95 





Oximetry   














  09/12/18





  15:22


 


Temperature 97.5 F


 


Pulse Rate 76


 


Respiratory 20





Rate 


 


Blood Pressure 121/85





(mmHg) 


 


O2 Sat by Pulse 98





Oximetry 











Oxygen Devices in Use Now: None


Appearance: 77 yo M in nAD, aAOx3


Eyes: No Scleral Icterus, - - R cornea cloudy


Ears/Nose/Mouth/Throat: NL Teeth, Lips, Gums, Mucous Membranes Moist


Neck: NL Appearance and Movements; NL JVP, Trachea Midline


Respiratory: Symmetrical Chest Expansion and Respiratory Effort


Cardiovascular: NL Sounds; No Murmurs; No JVD


Abdominal: NL Sounds; No Tenderness; No Distention


Lymphatic: No Cervical Adenopathy


Extremities: No Edema, No Clubbing, Cyanosis


Skin: No Rash or Ulcers, No Nodules or Sclerosis


Neurological: Alert and Oriented x 3, - - R Hyde's palsy


Result Diagrams: 


 09/08/18 12:26





 09/10/18 06:29


Microbiology and Other Data: 


 Microbiology











 09/09/18 11:00 Urine Culture - Final





 Urine 














Assess/Plan/Problems-Billing


Assessment:





78 year old gentleman with a history of likely Bell's Palsy on the right, gait 

instability, urinary retention





- Patient Problems


(1) Facial droop


Comment: appears to be due to Bell's palsy


Lyme testing positive, but Western blot pending. Started on doxy on 9/11/18 and 

prior to that was treated x 3 days with Ceftriaxone


MRI with and without contrast - no CVA


EMG shows b/l LE's neuropathy that may be related to B12 deff, but LP 

recommended to r/o GBS. CSF shows 90 WBC and increased protein. VDRL, 

Cryptococcus antigen , Lyme serology on CSF pending. As per d/w Dr. Rose -

symtoms and findings may be Lyme related


HIV negative.


cont PT, likely d/c to [facility] on 9/13/18 with f/u with Dr. Hancock 

scheduled on 10/4/18   





(2) Unsteady gait


Comment: Likely neuro symptom of severe Vit B12 defficiency


cont daily Vit B12 injectionsx 7 days, then monthly. anti intrinsic factor 

positive, but it may be elevated due to concurrent Vit B12 injections. 

Recommended anti-intrinsic factor repeat 2 weeks after Vit B12 injection in the 

future to r/o pernicious anemia    





(3) Bagley catheter in place


Comment: h/o retention x 1 month , cont finasteride   





(4) Vitamin B 12 deficiency


Comment: severe with neuro symptoms.


Tx as above.


Cont PT/OT - approved for STR   





(5) HTN (hypertension)


Comment: cont  lopressor. One episode of 20 beats of V. tach on 9/9/18


Echo showed EF 45-50%. will d/c telem   





(6) UTI (urinary tract infection)


Comment: cx  shows mixed jason, possible Bagley contamination


 Ceftriaxone d/c'd


Bagley exchanged on 9/9/18   





(7) DVT prophylaxis


Comment: HSQ   


Status and Disposition: 


inpatient, awaiting remaining lab values from CSF  to come back. Likely d/c to 

[facility] on 9/13/18

## 2018-09-12 NOTE — PN
Subjective


Date of Service: 09/12/18


Length of Stay: 4 Days





Neurology is following for Bell's Palsy and Neuropathy with gait disturbance





Interval History: 





Overnight, no new issues reported.  Tolerated LP without incident.  Remains 

somewhat steady when walking.  No falls.  He is eating this am.  Denies any new 

neurologic symptoms.  No headache, no new vision changes, no new numbness, 

tingling or weakness.  "I feel alright."


Review of Systems: 


*****


Denied CP, SOB, or palpitations, headaches, new vision changes (chronic right 

vision loss), N/V.  Eating well.  Bagley in place








Objective


Active Medications: 








Acetaminophen (Tylenol Tab*)  650 mg PO Q6H PRN


   PRN Reason: PAIN


   Last Admin: 09/11/18 23:43 Dose:  650 mg


Aspirin (Aspirin 81 Mg Chew Tab*)  81 mg PO DAILY [facility]


   Last Admin: 09/12/18 08:00 Dose:  81 mg


Cyanocobalamin (Vitamin B12 Inj *)  1,000 mcg IM DAILY [facility]


   Stop: 09/14/18 23:59


   Last Admin: 09/12/18 08:01 Dose:  1,000 mcg


Doxycycline Hyclate (Vibramycin Cap(*))  100 mg PO BID [facility]


   Last Admin: 09/12/18 07:59 Dose:  100 mg


Finasteride (Proscar Tab*)  5 mg PO DAILY [facility]


   Last Admin: 09/12/18 08:00 Dose:  5 mg


Gabapentin (Neurontin Cap(*))  900 mg PO TID [facility]


   Last Admin: 09/12/18 07:56 Dose:  900 mg


Heparin Sodium (Porcine) (Heparin Vial(*))  5,000 units SUBCUT Q8HR [facility]


   Last Admin: 09/12/18 05:15 Dose:  5,000 units


Hydralazine HCl (Apresoline Iv*)  10 mg IV SLOW PU Q2H PRN


   PRN Reason: SYSTOLIC BP GREATER THAN:


Metoprolol Tartrate (Lopressor Tab*)  25 mg PO Q12HR [facility]


   Last Admin: 09/12/18 07:58 Dose:  25 mg


Morphine Sulfate (Morphine Inj ((Syringe))*)  2 mg IV Q2H PRN


   PRN Reason: PAIN


   Last Admin: 09/12/18 07:48 Dose:  2 mg


Nystatin (Nystatin Top Powder*)  1 applic TOPICAL TID [facility]


   Last Admin: 09/12/18 08:04 Dose:  1 applic








 Vital Signs











  09/11/18 09/11/18 09/11/18





  08:51 11:53 13:30


 


Temperature  97.7 F 97.0 F


 


Pulse Rate  62 59


 


Respiratory 16 20 18





Rate   


 


Blood Pressure  134/89 129/88





(mmHg)   


 


O2 Sat by Pulse  100 100





Oximetry   














  09/11/18 09/11/18 09/11/18





  14:29 14:55 17:28


 


Temperature   


 


Pulse Rate   


 


Respiratory 16 16 16





Rate   


 


Blood Pressure   





(mmHg)   


 


O2 Sat by Pulse   





Oximetry   














  09/11/18 09/11/18 09/11/18





  17:43 19:41 20:00


 


Temperature 98.3 F 97.7 F 


 


Pulse Rate 77 82 


 


Respiratory 20 20 18





Rate   


 


Blood Pressure 145/97 134/93 





(mmHg)   


 


O2 Sat by Pulse 98 95 





Oximetry   














  09/11/18 09/11/18 09/12/18





  22:24 23:10 00:30


 


Temperature  97.5 F 


 


Pulse Rate  71 


 


Respiratory 18 18 18





Rate   


 


Blood Pressure  150/97 





(mmHg)   


 


O2 Sat by Pulse  97 





Oximetry   














  09/12/18 09/12/18 09/12/18





  00:37 01:39 03:42


 


Temperature   97.5 F


 


Pulse Rate   67


 


Respiratory 18 18 20





Rate   


 


Blood Pressure   138/84





(mmHg)   


 


O2 Sat by Pulse   99





Oximetry   














  09/12/18 09/12/18 09/12/18





  07:21 07:48 07:56


 


Temperature 98.0 F  


 


Pulse Rate 65  


 


Respiratory 16 18 18





Rate   


 


Blood Pressure 152/102  





(mmHg)   


 


O2 Sat by Pulse 98  





Oximetry   











Intake and Output Last 24 Hours











 09/10/18 09/11/18 09/12/18 09/13/18





 06:59 06:59 06:59 06:59


 


Intake Total 1370 990 865 


 


Output Total 1275 1035 1375 


 


Balance 95 -45 -510 


 


Weight  206 lb 8 oz 206 lb 8 oz 


 


Intake:    


 


  IV Fluids 50   


 


    ABX - CEFTRIAXONE 50   


 


  Oral 1320 990 865 


 


Output:    


 


  Bagley 1275 1035 1375 


 


Other:    


 


  # Bowel Movements 1 0 0 


 


  Estimated Stool Amount Large   











Oxygen Devices in Use Now: None


Neurology Exam: 





General: 





HEENT: Normocephalic/atraumatic, sclera anicteric, mucous membranes moist





Neck: Supple





Chest: Clear to auscultation bilaterally 





Cardiovascular: Regular rate and rhythm without murmurs, rubs, gallops





Abdomen: Soft, nontender/nondistended





Extremities: No clubbing, cyanosis, or edema





Skin:  Warm and dry








Neurological Findings: 





Awake, Alert, Oriented x3





Speech: fluent without dysarthria, repetition intact





Cranial Nerve: PEERL, EOM intact, Marked vision loss in the right eye, no 

nystagmus on the left, weakness in the upper and lower face on the right, 

facial sensation intact, hearing intact to finger rub bilaterally, palate 

elevates symmetrically, tongue midline





Motor: 5/5 throughout, proximal and distal extremities x4 tone/bulk normal.  No 

focal weakness, no fasciculation appreciated





Sensation: Diminished to LT/PP in the feet to shins, with reduced PP in the 

hands





Deep Tendon Reflex: 1+ symmetric in the upper extremities, 2+ at the patella, 1

+ at the ankles, equivocal Babinski 





Finger to nose, rapid alternating movements intact without tremor, no 

dysdiadochokinesia





Gait: Unsteady, wide based








Result Diagrams: 


 09/08/18 12:26





 09/10/18 06:29


Microbiology and Other Data: 


 Microbiology











 09/09/18 11:00 Urine Culture - Final





 Urine 














Assessment/Plan


Assessment:





78 year old gentleman with a history of likely Bell's Palsy on the right, gait 

instability, urinary retention, Lyme WB positive.  ID following, now on 

Doxycycline for presumed Lyme infection.  EMG/NCS showed a sensory motor 

neuropathy with long tract findings, concerning for a possible inflammatory 

neuropathy, CSF with 90 WBC suggestive of an inflammatory/infectious disease.  

B12 low, on replacement. MRI w/wo shows no enhancement.  Chronic changes.  A 

unifying diagnosis remains cryptic.





1.  Concern for Lyme disease:  Continue Doxycycline: ID following.  S/P LP: 

Follow up studies.  Unlikely neurosyphilis but follow up VDRL.  Follow up 

cytology.  Follow up HIV serology.


2.  Unlikely AIDP/CIDP, reflexes preserved, consider MRI L/S spine


3.  B12 deficiency:  posterior column?  On replacement. Workup in progress


4.  PT





Will continue to follow

## 2018-09-12 NOTE — PN
PROGRESS NOTE:

 

DATE OF VISIT:  09/11/18

 

PATIENT OF:  Dr. Freeman.

 

HISTORY:  This is a neurological followup on this 78-year-old man with recent 
onset of gait disturbance and Bell's palsy and some facial pain.  His symptoms 
are unchanged today from yesterday.  He still has his Bell's palsy and gait 
disturbance.

 

MEDICATIONS:  His medications continued to be:

1.  Metoprolol 25 mg q.12 hours.

2.  Heparin q.8 hours subcu.

3.  Gabapentin 900 t.i.d.

4.  Finasteride 5 mg daily.

5.  Aspirin 81 mg daily.

6.  He is getting B12 injections.

 

PHYSICAL EXAMINATION:  On exam, temperature 97, pulse 59, respirations 16, 
blood pressure 129/88.  He is alert and oriented with normal speech and 
comprehension. Cranial nerves II through XII were abnormal for his right Bell's 
palsy.  He is also blind in his right eye chronically.  Motor exam revealed 
normal tone and strength, but is wide based unsteady gait.  Brisk reflexes.  
Chest:  Clear.  Cardiovascular: Regular rate and rhythm.  Abdomen:  Soft with 
positive bowel sounds.

 

DIAGNOSTIC STUDIES:  I performed an EMG nerve conduction study on in today 
given his recent onset of gait disturbance and this showed a sensory motor 
peripheral neuropathy worse in the legs than in the arms, but with long tract 
being worse than more distal nerve conduction studies in the arms.  This raised 
the issue of polyradiculopathy and because of that I asked Dr. Freeman to obtain a 
spinal tap, which showed 90 white cells with 90% lymphs, 10% monos.  Total 
protein 187, glucose of 60.  Specialized tests including cytology, VDRL, Lyme 
titers are pending.  His SABRINA is negative.  His intrinsic factor antibody is 
positive.  His Lyme disease serology is positive.  This is Western Blot 
pending.  I obtained an MRI scan with contrast on and after his positive CSF 
around his brainstem because his findings could be suggestive of brainstem 
meningitis with his Bell's palsy but is polyradiculopathy.  I reviewed this and 
this was negative.

 

ASSESSMENT AND PLAN:  Mr. Hernández is a complicated case.  His elevated white 
count and CSF would not be consistent with purely inflammatories, 
polyradiculopathy such as Guillain-Tacoma or chronic demyelinating 
polyradiculopathy.  It suggests either more of an autoimmune or infectious 
process and in addition to adding cryptococcal antigen.  We have asked Dr. Rose to see and give his input.  It is possible that his Bell's palsy is 
secondary to Lyme disease and he may have a Lyme meningitis.  His peripheral 
neuropathy may be independent and secondary to his low B12, but this remains to 
be seen as possible that he despite is negative MRI scan with contrast he could 
considerably have a carcinomatous meningitis and we await the CSF cytology.

 

Thank you for sharing his case.

 

 [id number] #: [id number]

REBEKAH
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
## 2018-10-01 NOTE — ED
Syncope/Near Syncope





- HPI Summary


HPI Summary: 





An 77 y/o M presents to ED s/p syncopal episode which spontaneously resolved 

onset PTA. Patient was seated talking to someone, but prior to that, had been 

had physical therapy for his legs. Associated sx: mild dizziness and tiredness 

prior to episode; pale. Denies: HA, CP, SOB. Patient states feeling OK at 

bedside. PMHx: Lyme dz. 








- History Of Current Complaint


Chief Complaint: EDSyncope


Time Seen by Provider: 10/01/18 11:51


Hx Obtained From: Patient


Onset/Duration: Sudden Onset, Resolved


Activity At Onset: At Rest


Alleviating Factor(s): Spontaneous Resolution


Associated Signs And Symptoms: Dizzy - mild, Other - pos: mild tiredness. neg: 

CP, SOB, HA





- Allergies/Home Medications


Allergies/Adverse Reactions: 


 Allergies











Allergy/AdvReac Type Severity Reaction Status Date / Time


 


No Known Allergies Allergy   Verified 09/08/18 12:20














PMH/Surg Hx/FS Hx/Imm Hx


Previously Healthy: No


Endocrine/Hematology History: 


   Denies: Hx Anticoagulant Therapy, Hx Diabetes


Cardiovascular History: 


   Denies: Hx Hypertension, Hx Pacemaker/ICD


   Comment Only: Other Cardiovascular Problems/Disorders - MI


Respiratory History: 


   Denies: Hx Asthma, Hx Chronic Obstructive Pulmonary Disease (COPD), Hx Lung 

Cancer, Hx Pulmonary Embolism


GI History: 


   Denies: Hx Gastroesophageal Reflux Disease


 History: Reports: Hx Benign Prostatic Hyperplasia


   Comment Only: Other  Problems/Disorders - urine retention


Musculoskeletal History: Reports: Hx Back Problems


Sensory History: Reports: Hx Contacts or Glasses, Hx Legally Blind - Rt eye, Hx 

Hearing Problem


   Denies: Hx Cataracts, Hx Eye Injury, Hx Eye Prosthesis, Hx Glaucoma, Hx 

Macular Degeneration, Hx Deafness, Hx Hearing Aid


Opthamlomology History: Reports: Hx Contacts or Glasses, Hx Legally Blind - Rt 

eye


   Denies: Hx Cataracts, Hx Eye Injury, Hx Eye Prosthesis, Hx Glaucoma, Hx 

Macular Degeneration


Neurological History: Reports: Hx Headaches


Psychiatric History: 


   Denies: Hx Panic Disorder





- Surgical History


Surgery Procedure, Year, and Place: stents in heart, tonsils, appendix, hernia 

x9, gallbladder, right ankle, right femur


Infectious Disease History: No


Infectious Disease History: 


   Denies: Hx Clostridium Difficile, Hx Hepatitis, Hx of Known/Suspected MRSA, 

Hx Shingles, Hx Tuberculosis, Hx Known/Suspected VRE, Traveled Outside the [place] 

in Last 30 Days





- Family History


Known Family History: Positive: Cardiac Disease





- Social History


Occupation: Retired


Lives: Assisted Living


Alcohol Use: None


Substance Use Type: Reports: None


Smoking Status (MU): Former Smoker





Review of Systems


Constitutional: Other - paleness 


Positive: Other - pos: mild tiredness


Negative: Chest Pain


Negative: Shortness Of Breath


Neurological: Other - pos: mild dizziness


Positive: Syncope.  Negative: Headache


All Other Systems Reviewed And Are Negative: Yes





Physical Exam





- Summary


Physical Exam Summary: 





VITAL SIGNS: Reviewed. 


GENERAL:  Patient is a well-developed and nourished MALE who is lying 

comfortable in the stretcher.  Patient is not in any acute respiratory 

distress. 


HEAD AND FACE: No signs of trauma.  No ecchymosis, hematomas or skull 

depressions. No sinus tenderness. 


EYES: PERRLA, EOMI x 2, No injected conjunctiva, no nystagmus.


EARS: Hearing grossly intact. Ear canals and tympanic membranes are within 

normal limits. 


MOUTH: Oropharynx within normal limits. 


NECK: Supple, trachea is midline, no adenopathy, no JVD, no carotid bruit, no c-

spine tenderness, neck with full ROM.


CHEST: Symmetric, no tenderness at palpation 


LUNGS: Clear to auscultation bilaterally. No wheezing or crackles.


CVS: Regular rate and rhythm, S1 and S2 present, no murmurs or gallops 

appreciated. 


ABDOMEN: Soft, non-tender. No signs of distention. No rebound, no guarding, and 

no masses palpated. Bowel sounds are normal. 


EXTREMITIES: FROM in all major joints, no edema, no cyanosis or clubbing.


NEURO: Alert and oriented x 3. No acute neurological deficits. Speech is normal 

and follows commands.


SKIN: Dry and warm





Triage Information Reviewed: Yes


Vital Signs On Initial Exam: 


 Initial Vitals











Temp Pulse Resp BP Pulse Ox


 


 97.6 F   58   24   104/69   95 


 


 10/01/18 11:46  10/01/18 11:46  10/01/18 11:46  10/01/18 11:46  10/01/18 11:46











Vital Signs Reviewed: Yes





- Ortiz Coma Scale


Best Eye Response: 4 - Spontaneous


Best Motor Response: 6 - Obeys Commands


Best Verbal Response: 5 - Oriented


Coma Scale Total: 15





Diagnostics





- Vital Signs


 Vital Signs











  Temp Pulse Resp BP Pulse Ox


 


 10/01/18 11:46  97.6 F  58  24  104/69  95














- Laboratory


Result Diagrams: 


 10/02/18 04:58





 10/02/18 05:00


Lab Statement: Any lab studies that have been ordered have been reviewed, and 

results considered in the medical decision making process.





- Radiology


  ** CXR


Xray Interpretation: No Acute Changes - IMPRESSION: No evidence for acute dz. 

ED provider has reviewed this report.


Radiology Interpretation Completed By: Radiologist





- CT


  ** BRAIN CT


CT Interpretation: No Acute Changes - IMPRESSION: 1. NO EVIDENCE FOR ACUTE 

INTRACRANIAL ABNORMALITY. 2. ATROPHY. ED provider has reviewed this report.


CT Interpretation Completed By: Radiologist





- EKG


  ** 1157


Cardiac Rate: Bradycardia - 58 bpm


EKG Rhythm: Sinus Bradycardia


ST Segment: Normal - no ST elevation





Course/Dx


Assessment/Plan: An 77 y/o M presents to ED s/p syncopal episode which 

spontaneously resolved onset PTA. Patient was seated talking to someone, but 

prior to that, had been had physical therapy for his legs. Associated sx: mild 

dizziness and tiredness prior to episode; pale. Denies: HA, CP, SOB. Patient 

states feeling OK at bedside. PMHx: Lyme dz.  Blood work without any 

significant abnormality, troponin and d-dimer are negative.  Head CT impression

: No acute interconnected pathology.  Chest x-ray impression no acute 

pathology.  Because of the syncopal episode with positive loss of consciousness 

and he is comorbidities I discuss my physical exam and findings with Dr. Harris 

from the hospitalist services who accepted the patient for admission.  At this 

point the patient is hemodynamically stable alert oriented 3.





- Diagnoses


Differential Diagnosis/HQI/PQRI: Positive: Cerebral Vascular Accident, Coronary 

Artery Disease, Dysrhythmia, Seizure, Transient Ischemic Attack, Vasovagal 

Episode


Provider Diagnoses: 


 Syncope








- Physician Notifications


Discussed Care of Patient With: Obed Harris - hospitalist


Time Discussed With Above Provider: 13:32


Instructed by Provider To: Admit As Inpatient





Discharge





- Sign-Out/Discharge


Documenting (check all that apply): Patient Departure - ADM





- Discharge Plan


Condition: Stable


Disposition: ADMITTED TO [facility]





- Billing Disposition and Condition


Condition: STABLE


Disposition: Admitted to [facility]





- Attestation Statements


Document Initiated by Scribe: Yes


Documenting Scribe: Amarjit Estrada


Provider For Whom Scribe is Documenting (Include Credential): Dr. Jose Suárez MD


Scribe Attestation: 


I, Amarjit Estrada, scribed for Dr. Jose Suárez MD on 10/02/18 at 0826. 


Scribe Documentation Reviewed: Yes


Provider Attestation: 


The documentation as recorded by the scribe, Amarjit Estrada accurately 

reflects the service I personally performed and the decisions made by me, Dr. Jose Suárez MD

## 2018-10-01 NOTE — CONS
CARDIOLOGY CONSULTATION:

 

DATE OF CONSULT:  10/01/18

 

INDICATION FOR CONSULTATION:  Syncope.

 

HISTORY OF PRESENT ILLNESS:  The patient is an 78-year-old gentleman with a 
history of urinary retention, recent history of Lyme disease with possible 
meningitis, history of coronary artery disease, who was at rehab today and had 
a syncopal episode.  The patient was in the hospital earlier this month with 
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  He was sent to rehab for strength 
training.  The patient did his morning exercises with a physical therapist, was 
back at his room resting when he had a witnessed syncopal episode.  The patient 
was sitting down at that time, the patient simply just slumped forward.  I do 
not have any other details of his episode.  The patient was transported to the 
emergency room.  On arrival in the emergency room, the patient had sinus 
bradycardia at 51 beats per minute, no evidence of any high-degree AV block, no 
other arrhythmias.  The patient was admitted to the hospital for observation.

 

The patient himself does not remember the incident.  He just remembers people 
rushing up to him after it had occurred.  The patient had no seizure activity 
that was documented.

 

PAST MEDICAL HISTORY:  Significant for right eye blindness secondary to 
infection, benign prostatic hypertrophy, history of coronary artery disease.

 

PAST SURGICAL HISTORY:  Cataract surgery, cholecystectomy, appendectomy.

 

OUTPATIENT MEDICATIONS:

1.  Ceftriaxone 1 g daily.

2.  Finasteride.

3.  Gabapentin.

4.  Metoprolol tartrate 25 mg b.i.d.

 

ALLERGIES:  No known drug allergies.

 

FAMILY HISTORY:  His mother  of a heart attack in her 80s.  Father  in 
his 50s of a heart attack.

 

SOCIAL HISTORY:  The patient had previously been living by himself on [facility], but is currently at rehab.  He is retired from [employer].  He 
denies tobacco or alcohol use.

 

REVIEW OF SYSTEMS:  Positive for right-sided facial weakness.  Positive for 
syncope.  Negative for changes in bowel or bladder habits.  The patient does 
have an indwelling Bagley catheter.  He denies any fevers or chills.

 

PHYSICAL EXAM:  Height is 6 feet 3 inches, weight 208 pounds, temperature 98.1, 
heart rate of 60, blood pressure 128/78, respiratory rate is 18, oxygen 
saturation 94% on room air.  Sclerae anicteric.  Oropharynx is pink without 
erythema. Carotids are 2+ without bruits.  JVD is normal.  Thyroid is normal.  
Cardiac Exam: S1, S2 without any murmurs, rubs, or gallops.  Lungs are clear to 
auscultation bilaterally.  There is no dullness to percussion.  Abdomen is soft
, nontender, nondistended with normoactive bowel sounds.  Extremities show no 
edema.  He has 2+ pulses throughout.  The patient is awake, alert, and 
oriented.  He moves all 4 extremities equally.

 

DIAGNOSTIC STUDIES/LAB DATA:  Echocardiogram on 18 demonstrates ejection 
fraction of 45% to 50%, no focal wall motion abnormalities, no significant 
valvular abnormalities.

 

Laboratory studies:  Chemistries within normal limits.  BUN 18, creatinine 0.9. 
AST and ALT are normal.  Troponin is negative.  TSH 2.6.  CBC within normal 
limits.

 

EKG demonstrates sinus bradycardia with T-wave flattening.

 

IMPRESSION AND PLAN:  The patient is an 78-year-old gentleman with recent 
history of Lyme meningitis, who was admitted to the hospital with a syncopal 
episode.  The patient's initial EKG showed sinus bradycardia, but no evidence 
of AV block.

 

For now, my recommendation is to stop his beta-blocker.  The patient will 
continue on telemetry overnight.  The patient may benefit from long-term 
outpatient monitoring.  I do not think any other cardiac testing is necessary.

 

 [id number] #: [id number]

[facility]

## 2018-10-01 NOTE — RAD
INDICATION:  Syncope.



COMPARISON:  Comparison is made with a prior study from September 08, 2018.



TECHNIQUE: A portable view of the chest was obtained.



FINDINGS: Cardiac and mediastinal contours appear to be within normal limits.



The lungs are clear. No pleural effusion is seen.



IMPRESSION:  NO EVIDENCE FOR ACUTE DISEASE.

## 2018-10-01 NOTE — HP
CC:  Dr. Woods; Dr. Rose; Dr. Moreno *

 

HISTORY AND PHYSICAL:

 

DATE OF ADMISSION:  10/01/18

 

PRIMARY CARE PROVIDER:  Dr. Woods.

 

ATTENDING PHYSICIAN WHILE IN THE HOSPITAL:  Michael Harris MD * (report 
dictated by Anil Sood NP).

 

CONSULTING INFECTIOUS DISEASE SPECIALIST:  Dr. Rose.

 

CONSULTING CARDIOLOGIST:  Dr. Moreno.

 

CHIEF COMPLAINT:  Syncope.

 

HISTORY OF PRESENT ILLNESS:  Mr. Hernández is an 78-year-old male patient, who 
was recently seen here in the hospital, was admitted, found to have Bell palsy 
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  He was discharged 
about a month ago to [facility].  He was undergoing physical therapy there.  He 
unfortunately today was doing his [facility] physical therapy session, he 
completed the session, he never once had chest pain or shortness of breath.  He 
walked up and down the unit twice at [facility].  He went back to his room, was 
sitting in the chair waiting for his second session, and the next thing he knew 
he woke up and he was surrounded by people around him.  He denied any chest 
pain prior to or after this event.  He says he just felt tired.  He felt 
fatigued.  He denied feeling palpitations or fluttering in his chest.  He 
denies any recent fevers or chills.  He denies having any abdominal pain or any 
nausea or vomiting or diarrhea.  He was being evaluated again by therapy.  He 
finished the physical therapy.  Because of the syncopal episode, he came in.  
He does carry a history of BPH with a chronic Bagley, Bell palsy, Lyme meningitis
, history of MI, polyneuropathy history.  He is legally blind in his right eye.
  He has a history of CAD.  He is on beta-blockers, but there has been no 
change in medications with the exception that they have added on Tylenol and he 
has been on doxycycline for the Lyme.  He came in to the ED today for syncope.  
It was noted when he came in that on his initial EKG, he was bradycardic in the 
50s but because of the syncope, we were asked to evaluate for admission.

 

PAST MEDICAL HISTORY:  Significant for:

1.  Bell palsy.

2.  Lyme meningitis.

3.  BPH.

4.  MI.

5.  Chronic pain.

6.  Polyneuropathy.

7.  Legally blind in the right eye.

8.  CAD.

 

PAST SURGICAL HISTORY:

1.  He has had a heart catheterization and 1 sent according to the patient.

2.  He has had appendectomy.

3.  He has had cholecystectomy.

 

HOME MEDICATIONS:  According to the list that he provided, includes:

1.  Travatan 1 drop right eye q.p.m.

2.  Timolol 1 drop right eye b.i.d.

3.  Flomax 0.4 mg daily.

4.  Pilocarpine 1 drop left eye as directed.

5.  Prilosec 20 mg daily.

6.  Nystatin powder 1 application topically t.i.d.

7.  Lopressor 25 mg p.o. every 12 hours.

8.  Milk of magnesia 30 cc p.o. daily.

9.  Gabapentin 900 mg p.o. t.i.d.

10.  Proscar 5 mg daily.

11.  Doxycycline 100 mg p.o. b.i.d.

12.  B12 1000 mcg IM monthly.

13.  Tessalon 100 mg p.o. t.i.d. as needed.

14.  Aspirin 81 mg daily.

15.  Tylenol 650 mg every 6 hours as needed for pain.

 

ALLERGIES TO MEDICATIONS:  Include no known drug allergies.

 

FAMILY HISTORY:  Both his parents had MI.

 

SOCIAL HISTORY:  He does not smoke.  He does not drink.  He resides at [facility] 
currently.  Surrogate decision maker is his sister and his friend.

 

REVIEW OF SYSTEMS:  There is no documented fever.  He denies having any 
significant weight change.  There is no double vision.  He denies having any 
ear discharge. There is no rhinorrhea.  There is no sore throat, no thyroid 
enlargement.  Denied having any chest pain.  There is no orthopnea.  There is 
no nocturnal dyspnea. There was a loss of consciousness.  There is no pruritus, 
and there is no skin ulceration.  Review of 14 systems completed, all others 
negative.

 

                               PHYSICAL EXAMINATION

 

GENERAL:  Mr. Hernández is an 78-year-old male patient.  He is sitting in the ED 
stretcher.  He does not appear to be in any acute distress.

 

VITAL SIGNS:  Blood pressure 104/64, pulse is 58, respirations were 24, O2 sat 
95%, temperature 97.6.

 

HEENT:  Head:  Atraumatic, normocephalic.  Eyes:  EOMs are intact.  Sclerae 
anicteric and not pale.  Throat:  Oral mucosa appears to be moist.  No 
oropharyngeal erythema.

 

NECK:  Supple.

 

LUNGS:  Clear to auscultation bilaterally.  There are no wheezes, rales, or 
rhonchi.

 

HEART:  Sounds S1, S2.  He had a regular rate and rhythm.  He has currently 
heart rate in the mid 60.  There are no murmurs, rubs, or gallops.

 

ABDOMEN:  Soft, it was flat, it was nontender.  Bowel sounds were present.

 

EXTREMITIES:  Pulses are 2+ throughout.  He is moving all 4 extremities with 5/
5 strength.

 

NEUROLOGIC:  The patient is awake.  He is alert.  He is oriented x3.  His 
tongue is midline.  His  are equal.  He has a facial droop to the right 
side baseline. No new deficits 

 

SKIN:  Intact.

 

 DIAGNOSTIC STUDIES/LAB DATA:  Labs today reveal WBC of 9.2, RBC of 4.07, 
hemoglobin 13.7, hematocrit of 40, his platelet count was 240,000.  INR 0.95.  D
-dimer less than 100.  Sodium 136, potassium 4.3, chloride 106, bicarb 26, BUN 
18, creatinine 0.95, glucose 95, lactate 1.8, calcium 9, mag 2.0.  Total bili 
1.4, AST 19, ALT 31, alk phos 75.  Troponin 0.00.  Ammonia of 31.  TSH normal.  
Albumin of 3.8.  Toxicology negative.  UA is pending.

 

He had a brain CT obtained today.  Brain CT showed no evidence for acute 
intracranial abnormality, atrophy.

 

Chest x-ray showed no evidence for acute disease.

 

EKG shows a sinus bradycardia, rate of 58.  No ST elevations or T-wave 
inversions were noted.  We have reviewed to his previous EKG, the rate is 
different.  Today, he is bradycardic but there are no significant changes from 
previous EKGs with exception of the rate.  His AL today was noted to be 0.17.

 

He just had an echo done less than a month ago, which showed EF of 45% to 50%.  
No prior echo for compare.

 

He had a head CTA as well, which showed atherosclerosis at the origin in the 
left internal carotid artery, no significant carotid artery stenosis.  Old 
medical records reviewed.

 

ASSESSMENT AND PLAN:  Mr. Heránndez is an 78-year-old male patient coming into 
the ED today with complaints of a syncopal episode.  We were asked to evaluate 
for admission.  He will be admitted under observation status for:

 

1.  Syncope.  At this point, etiology is unclear.  I am concerned for 
arrhythmia particularly in the setting of recent Lyme.  I did place a consult 
with Dr. Moreno. I also placed a consult with Dr. Rose.  I am going to go 
ahead and put the patient on 2 g of IV Rocephin for the time being.  We may 
need to consider longer event monitoring.  I am holding his beta-blockers.  We 
will get orthostatic blood pressures.  I am holding the patient's beta-blockers 
per the recommendations of Cardiology given the setting of recent syncope and I 
will continue to monitor him on telemetry.

2.  Bell palsy.  Continue current medical regimen.  Follow with Neurology.

3.  History of Lyme meningitis.  He will be placed on Rocephin.

4.  Benign prostatic hypertrophy.  Continue Bagley and Proscar and finasteride.

5.  History of myocardial infarction and coronary artery disease.  Continue the 
aspirin for the time being and holding beta-blockers per recommendations of 
Cardiology.

6.  Neuropathy.  Continue his gabapentin.

7.  History of right eye blindness.  Continue his eyedrops with the exception 
of timolol particularly in the setting of bradycardia.

8.  DVT prophylaxis.  He will be placed on heparin subcu.

9.  Code status.  He is a DNR.

10.  Fluids, electrolytes, and nutrition.  He can have a heart healthy diet.

 

TIME SPENT:  On the admission was 60 minutes, greater than half of the time was 
spent face-to-face with the patient, obtaining my history and physical, the 
other half of the time was spent going over the plan of care with the patient 
and implementing the plan of care.

 

I did discuss plan of care with my attending, Dr. Harris; he is in agreement.

 

 ____________________________________ ANIL SOOD, RAMANA

 

[id number] #: [id number]

REBEKAH

## 2018-10-01 NOTE — RAD
INDICATION:  Syncope.



COMPARISON:  Comparison is made with a prior CT of the brain from September 08, 2018.



TECHNIQUE: Contiguous axial sections of the brain were obtained from the skull base to the

vertex without contrast.



FINDINGS: The ventricles, cisterns and sulci are enlarged consistent with diffuse atrophy.

 No significant focal abnormality or mass effect is seen. There is no evidence for

hemorrhage.



No significant focal osseous abnormality is seen. There is a small round density

visualized in the posterior aspect of the right maxillary sinus measuring 8 mm in size

most consistent with a mucous retention cyst or polyp. The visualized portion of the

paranasal sinuses and mastoid air cells otherwise appear clear.



IMPRESSION:

1. NO EVIDENCE FOR ACUTE INTRACRANIAL ABNORMALITY.

2. ATROPHY.

## 2018-10-02 NOTE — PN
Progress Note





- Progress Note


Date of Service: 10/02/18


Note: 





I spoke to a staff member at [facility].  She stated the patient had his Bagley 

changed in the ED 9/22 or 9/23.

## 2018-10-03 NOTE — PN
Subjective


Date of Service: 10/02/18


Interval History: 





No new c/o.  





Objective


Active Medications: 








Acetaminophen (Tylenol Tab*)  650 mg PO Q4H PRN


   PRN Reason: FEVER/PAIN


   Last Admin: 10/02/18 00:25 Dose:  650 mg


Aspirin (Aspirin 81 Mg Chew Tab*)  81 mg PO DAILY [facility]


   Last Admin: 10/03/18 08:57 Dose:  81 mg


Benzonatate (Tessalon Cap*)  100 mg PO TID PRN


   PRN Reason: COUGH


Doxycycline Hyclate (Vibramycin Cap(*))  100 mg PO BID [facility]


   Last Admin: 10/03/18 08:56 Dose:  100 mg


Finasteride (Proscar Tab*)  5 mg PO DAILY [facility]


   Last Admin: 10/03/18 08:57 Dose:  5 mg


Gabapentin (Neurontin Cap(*))  900 mg PO TID [facility]


   Last Admin: 10/03/18 08:56 Dose:  900 mg


Heparin Sodium (Porcine) (Heparin Vial(*))  5,000 units SUBCUT Q8HR [facility]


   Last Admin: 10/03/18 06:00 Dose:  5,000 units


Latanoprost (Xalatan 0.005%*)  1 drop RIGHT EYE 2100 [facility]


   Last Admin: 10/02/18 20:43 Dose:  1 drop


Metoprolol Tartrate (Lopressor Tab*)  12.5 mg PO Q12HR [facility]


   Last Admin: 10/03/18 08:58 Dose:  12.5 mg


Omeprazole (Prilosec Cap*)  20 mg PO DAILY [facility]


   Last Admin: 10/03/18 09:00 Dose:  20 mg


Pilocarpine HCl (Pilocarpine 1% Opth.Sol*)  1 drop LEFT EYE Q30D [facility]


   Last Admin: 10/01/18 20:25 Dose:  1 drop


Tamsulosin HCl (Flomax Cap*)  0.4 mg PO DAILY [facility]


   Last Admin: 10/03/18 08:56 Dose:  0.4 mg








 Vital Signs - 8 hr











  10/03/18 10/03/18 10/03/18





  07:33 08:56 11:17


 


Temperature 98.2 F  97.8 F


 


Pulse Rate 64  72


 


Respiratory 20 20 20





Rate   


 


Blood Pressure 112/77  114/65





(mmHg)   


 


O2 Sat by Pulse 98  98





Oximetry   














  10/03/18 10/03/18





  11:27 12:00


 


Temperature  97 F


 


Pulse Rate  68


 


Respiratory 20 16





Rate  


 


Blood Pressure  101/69





(mmHg)  


 


O2 Sat by Pulse  100





Oximetry  











Oxygen Devices in Use Now: None


Appearance: Alert, in a chair.  Somewhat irritable but otherwise looks 

comfortable.


Eyes: No Scleral Icterus


Respiratory: Symmetrical Chest Expansion and Respiratory Effort, Clear to 

Auscultation, Clear to Percussion


Cardiovascular: NL Sounds; No Murmurs; No JVD, No Edema, - - rapid but regular


Extremities: No Edema, No Clubbing, Cyanosis, -


Skin: No Rash or Ulcers, No Nodules or Sclerosis, -


Neurological: Alert and Oriented x 3, NL Sensation, NL Gait, NL Muscle Strength 

and Tone, -


Result Diagrams: 


 10/03/18 06:46





 10/02/18 05:00





Assess/Plan/Problems-Billing


Assessment: 











- Patient Problems


(1) Syncope


Current Visit: Yes   Status: Acute   Code(s): R55 - SYNCOPE AND COLLAPSE   

SNOMED Code(s): 982507831


   Comment: Occurred while sitting, suspicious for arrhythmia.  He became 

tachycardic when his metorpolol was withheld, now on half his prior dose.    





(2) Lyme meningitis


Current Visit: Yes   Status: Acute   Code(s): A69.21 - MENINGITIS DUE TO LYME 

DISEASE   SNOMED Code(s): 986642731


   Comment: Continue doxycycline, finish 10/11/18.   





(3) Bagley catheter in place


Current Visit: No   Status: Acute   Code(s): Z92.89 - PERSONAL HISTORY OF OTHER 

MEDICAL TREATMENT   SNOMED Code(s): 163649315


   Comment: Last change about 9/22/18,  cont finasteride and tamsulosin if 

considering voiding trial.    





(4) CAD (coronary artery disease)


Current Visit: Yes   Status: Acute   Code(s): I25.10 - ATHSCL HEART DISEASE OF 

NATIVE CORONARY ARTERY W/O ANG PCTRS   SNOMED Code(s): 53544653


   Comment: Continue ASA, metorpolol.

## 2018-10-03 NOTE — PN
Progress Note





- Progress Note


Date of Service: 10/03/18


Note: 





Time spent on discharge 45 minutes, including exam of patient, discussion with 

patimarly, nurse, CM, Dr. Hobbs, review of EMR and preparation of discharge 

documents.

## 2018-10-03 NOTE — PN
Subjective


Date of Service: 10/03/18 - CC: Syncope, LOC sitting


Interval History: 





No new c/o.  No dizziness or recurrent LOC.








Medications


Active Medications: 








Acetaminophen (Tylenol Tab*)  650 mg PO Q4H PRN


   PRN Reason: FEVER/PAIN


   Last Admin: 10/02/18 00:25 Dose:  650 mg


Aspirin (Aspirin 81 Mg Chew Tab*)  81 mg PO DAILY [facility]


   Last Admin: 10/03/18 08:57 Dose:  81 mg


Benzonatate (Tessalon Cap*)  100 mg PO TID PRN


   PRN Reason: COUGH


Doxycycline Hyclate (Vibramycin Cap(*))  100 mg PO BID [facility]


   Last Admin: 10/03/18 08:56 Dose:  100 mg


Finasteride (Proscar Tab*)  5 mg PO DAILY [facility]


   Last Admin: 10/03/18 08:57 Dose:  5 mg


Gabapentin (Neurontin Cap(*))  900 mg PO TID [facility]


   Last Admin: 10/03/18 08:56 Dose:  900 mg


Heparin Sodium (Porcine) (Heparin Vial(*))  5,000 units SUBCUT Q8HR [facility]


   Last Admin: 10/03/18 06:00 Dose:  5,000 units


Latanoprost (Xalatan 0.005%*)  1 drop RIGHT EYE 2100 [facility]


   Last Admin: 10/02/18 20:43 Dose:  1 drop


Metoprolol Tartrate (Lopressor Tab*)  12.5 mg PO Q12HR [facility]


   Last Admin: 10/03/18 08:58 Dose:  12.5 mg


Omeprazole (Prilosec Cap*)  20 mg PO DAILY [facility]


   Last Admin: 10/03/18 09:00 Dose:  20 mg


Pilocarpine HCl (Pilocarpine 1% Opth.Sol*)  1 drop LEFT EYE Q30D [facility]


   Last Admin: 10/01/18 20:25 Dose:  1 drop


Tamsulosin HCl (Flomax Cap*)  0.4 mg PO DAILY [facility]


   Last Admin: 10/03/18 08:56 Dose:  0.4 mg








Objective


Vital Signs:











Temp Pulse Resp BP Pulse Ox


 


 98.2 F   64   20   112/77   98 


 


 10/03/18 07:33  10/03/18 07:33  10/03/18 08:56  10/03/18 07:33  10/03/18 07:33











Oxygen Devices in Use Now: None


Appearance: elderly gentleman, lying in gurney, no distress.


Eyes: No Scleral Icterus, PERRLA


Ears/Nose/Mouth/Throat: Mucous Membranes Moist


Neck: NL Appearance and Movements; NL JVP


Respiratory: Symmetrical Chest Expansion and Respiratory Effort, Clear to 

Auscultation


Cardiovascular: NL Sounds; No Murmurs; No JVD, RRR, - - scarring on L chest 

wall from prior sx old, healed


Abdominal: NL Sounds; No Tenderness; No Distention - obese


Lymphatic: No Cervical Adenopathy


Extremities: No Edema


Skin: No Rash or Ulcers


Neurological: Alert and Oriented x 3


Lines/Tubes/Other Access: Clean, Dry and Intact Peripheral IV


Laboratory Results: 


 





 10/03/18 06:46 





 10/02/18 05:00 





 











INR (Anticoag Therapy)  0.98  (0.77-1.02)   10/02/18  05:00    


 


Total Bilirubin  1.40 mg/dL (0.2-1.0)  H  10/01/18  12:06    


 


AST  19 U/L (13-39)   10/01/18  12:06    


 


ALT  31 U/L (7-52)   10/01/18  12:06    


 


Alkaline Phosphatase  75 U/L ()   10/01/18  12:06    


 


B-Natriuretic Peptide  60 pg/mL (-100)  10/01/18  12:06    


 


Total Protein  6.3 g/dL (6.4-8.9)  L  10/01/18  12:06    


 


Albumin  3.8 g/dL (3.2-5.2)   10/01/18  12:06    


 


Globulin  2.5 g/dL (2-4)   10/01/18  12:06    


 


Albumin/Globulin Ratio  1.5  (1-3)   10/01/18  12:06    


 


TSH  2.61 mcIU/mL (0.34-5.60)   10/01/18  12:06    








 











  10/01/18 10/01/18 10/01/18





  12:06 16:15 18:48


 


Troponin I  0.00  0.00  0.00











Diagnostic Imaging: 


Echo 9/8/18:  EF 50%, no focal wall motion abnormalities.  Abnormal diastolic 

filling, AV sclerosis.  Aorta dilated.


EKG Data: 





ECG 10/2/18:  NSR 70 bpm, low volts limb leads, flattened T waves diffusely.





Assessment/Plan





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 

presents with LOC sitting after exercising.


Slow idioventricular rhythm seen on monitor off metoprolol.  Hx old MI, but no 

focal scar noted on echo.





Now on 1/2 his prior metoprolol and feeling well.





Implantable EM in, I set lower detection limit at 120 bpm in case slow VT.





EM will look for both bradyarrhythmias (common with Lyme) and tachyarrhythmias.





Pt should f/u with Dr Moreno in 1 week for a wound check.

## 2018-10-04 NOTE — TRS
CC:  Dr. Woods; [facility] *

 

TRANSFER SUMMARY:

 

DATE OF ADMISSION:

 

DATE OF TRANSFER:  10/03/18

 

HISTORY OF PRESENT ILLNESS:  This 78-year-old man was transferred from [facility] after a syncopal episode during physical therapy.  Actually after 
completing physical therapy, he went back to his room, was sitting in his chair 
waiting for another physical therapy session and then apparently passed out.  
He woke up surrounded by people around him.  He had no other symptoms.  There 
were no palpitations, chest pain, or shortness of breath.  He has not had this 
before.  The rest of the history is detailed in the admission note.

 

The patient was admitted to a monitor unit.  His metoprolol was withheld.  He 
was a little bradycardic.  After a day off the metoprolol, his heart rate was 
about 110 or so.  His metoprolol was restarted at half of the prior dose.  He 
seemed to do well with this with heart rate 64 to 72 on the day of discharge.  
He remained asymptomatic.  He had a loop recorder inserted on the day of 
discharge.

 

FINAL DIAGNOSES:

1.  Syncope.

2.  Coronary artery disease.

3.  Bell's palsy.

4.  Finishing treatment for Lyme meningitis.

5.  Glaucoma.

6.  Urinary retention.

 

If he is not going to have a voiding trial, but will simply have a chronic 
Bagley catheter, then I would recommend stopping the tamsulosin and finasteride.
  If a voiding trial is contemplated, then he should continue on all of his 
medications.

 

DISCHARGE MEDICATIONS:

1.  Metoprolol tartrate 12.5 mg b.i.d.

2.  Gabapentin 900 mg t.i.d.

3.  Finasteride 5 mg daily.

4.  Timolol 0.25% right eye b.i.d.

5.  Pilocarpine 1% 1 drop left eye as prescribed.

6.  Omeprazole 20 mg daily.

7.  Aspirin 81 mg daily.

8.  Travoprost 0.004% 1 drop right eye in the evening.

9.  Benzonatate 100 mg t.i.d. p.r.n.

10.  Tamsulosin 0.4 mg daily.

11.  Acetaminophen 650 mg every 6 hours p.r.n.

12.  Vitamin B12 1000 mcg intramuscularly monthly.

13.  Doxycycline 100 mg b.i.d. through 10/11/18, then stop.

14.  Magnesium hydroxide 30 mL every 6 hours p.r.n.

15.  Nystatin powder to affected areas t.i.d.

 

FOLLOWUP:  The patient will follow up with Dr. Moreno in 1 week for a wound 
check.

 

CONDITION ON DISCHARGE:  Stable.

 

DISPOSITION ON DISCHARGE:  Transfer to [facility].

 

 [id number]/[facility] #: [id number]

MTDD

## 2018-10-05 NOTE — OP
CC:  Dr. Moreno; primary care physician, Dr. Woods *

 

DATE OF OPERATION:  10/03/18 - ROOM #[id number]

 

DATE OF BIRTH:  [birth year]

 

SURGEON:  Estela Hobbs MD

 

PRE-OP DIAGNOSES:  Syncope, history of idioventricular rhythm.

 

POST-OP DIAGNOSES:  Syncope, history of idioventricular rhythm.

 

OPERATIVE PROCEDURE:  Event monitor implantation.

 

INDICATIONS:  The indications, risks and benefits were discussed with the 
patient and he was amenable to proceeding.

 

DESCRIPTION OF PROCEDURE:  The left fourth intercostal space was identified 
just lateral to the sternum and this area was prepped and draped in the usual 
sterile fashion.  A time-out was called.  Following this, the patient received 
10 cc of 1% lidocaine.  When following this, using a blade, a small nick was 
fashioned in the left fourth intercostal space just lateral to the sternum.  
Using the device in the kit, a small tunnel was made at 45-degree angle and the 
device was then pushed in using the plunger, sensing was borderline and 
variable from 0.17 to 0.30.  We tried different additional angles from this 
incision without success.  He has a scar inferior to this area from distant 
surgery and this prevented positioning vertically.  We therefore went up in 
inner space and had adequate sensing with vertical implantation.

 

Once we had good positioning, Steri-Strips were placed over both nicks followed 
by an external Mepilex dressing.

 

FINDINGS:  The device is a [employer] Reveal LINQ TruRhythm, LNQ11, serial #
[id number], R waves were sensed at 0.23 millivolts in higher settings.  
Detection rate was decreased to 120 beats a minute for 16 beats or longer and 
tessie detection with 30 beats a minute for 4 beats or longer and pauses of 3 
seconds or longer.

 

The patient was hemodynamically stable throughout the procedure and there were 
no complications.  Estimated blood loss less than 1 cc.

 

 [id number]/[facility] #: [id number]

[facility] Hospital[facility]IMI

## 2019-02-08 NOTE — HP
CC:  Bruce Woods MD *

 

ADMISSION HISTORY AND PHYSICAL:

 

DATE OF ADMISSION:  19

 

PRIMARY CARE PROVIDER:  Bruce Woods MD

 

ATTENDING PHYSICIAN:  Janett Amin MD * (DICTATED BY TONG MORALES NP)

 

HISTORY OF PRESENT ILLNESS:  This is a pleasant 78-year-old male patient who 
was brought in by EMS today with complaint of sharp periumbilical pain that 
started approximately 2 hours prior to his arrival.  He rated his pain as 10/
10.  He also had some nausea, but no vomiting.  The patient does state that he 
has had many abdominal surgeries secondary to hernia repairs.  He does have 
abdominal adhesions; however, he is reporting he has had normal bowel movements 
and he has been able to tolerate p.o.  The patient in the emergency department 
did have a CAT scan of the abdomen to rule out obstruction given his 
significant history.  The CAT scan of the abdomen and pelvis shows no current 
CT findings to correlate with current symptomatology.  He has recurrent 
anterior abdominal wall hernias, one contains fat and the other is a partial 
loop of colon, but there is no obstruction or strangulation noted, celiac 
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  
The patient also has an IVC filter that is noted to be patent on the CAT scan.  
The patient was given antibiotics.  His urine was checked. He was noted to have 
urinary tract infection.  A Bagley catheter was inserted.  Some of this pain was 
relieved after the Bagley was inserted, however, because the patient was very 
weak and dehydrated, we were asked to evaluate the patient for admission.

 

PAST MEDICAL HISTORY:  Significant for:

1.  BPH.

2.  Urinary retention.

3.  Glaucoma.  He is legally blind in the right eye.

4.  Also history of coronary artery disease with stents.

 

PAST SURGICAL HISTORY:  Includes:

1.  Appendectomy.

2.  Hernia repair x9 surgeries.

3.  Cholecystectomy.

4.  ORIF of the right ankle and femur.

5.  Tonsillectomy as a child.

 

HOME MEDICATIONS:  Include:

1.  Travoprost 1 drop right eye q.p.m.

2.  Timolol 1 drop right eye 2 times a day.

3.  Flomax 0.4 mg daily.

4.  Pilocarpine ophthalmic solution 1 drop to left eye daily.

5.  Omeprazole 20 mg daily.

6.  Nystatin t.i.d. as needed.

7.  Metoprolol tartrate 12.5 mg q.12 hours.

8.  Milk of magnesia 30 mL p.o. q.6 hours as needed.

9.  Gabapentin 900 mg p.o. t.i.d.

10.  Proscar 5 mg p.o. daily.

11.  Doxycycline 100 mg p.o. b.i.d.

12.  Vitamin B12 injections 1000 mcg monthly.

13.  Tessalon 100 mg p.o. t.i.d. as needed.

14.  Aspirin 81 mg daily.

15.  Tylenol 650 mg p.o. q.6 hours as needed.

 

FAMILY HISTORY:  Both parents with coronary artery disease,  of MI.

 

SOCIAL HISTORY:  The patient states he has a very remote history of smoking. 
Currently, does not smoke, does not drink alcohol, and denies any illicit drug 
use. His healthcare proxy is a neighbor, name is Sheng Leone, his phone 
number is [phone].

 

REVIEW OF SYSTEMS:  The patient is reporting a gassy like feeling in the 
abdomen. Nausea has improved.  He has no vomiting.  He denies any arthralgias 
or myalgias. No fevers or chills.  He does have some general weakness and 
fatigue and some suprapubic pain and otherwise denies any further 
constitutional complaints.

 

                               PHYSICAL EXAMINATION

 

GENERAL:  Reveals an elderly gentleman, well-nourished, in no acute distress; 
however, he is wincing with some pain intermittently.

 

VITAL SIGNS:  Blood pressure 169/92, heart rate 62, respiratory rate 19, O2 
saturation 99% on room air with a temperature of 96.1.

 

HEENT:  The patient is atraumatic, normocephalic.  PERRLA with nonicteric 
sclerae. Oral mucosa is very dry.  Tongue is midline.

 

NECK:  Supple, nontender.  No JVD noted.  No carotid bruits auscultated.

 

LUNGS:  Clear bilaterally to auscultation with no wheezing, rhonchi, or rales.

 

CARDIOVASCULAR:  S1, S2 present.  Rate and rhythm are regular with no murmurs, 
gallops, or rubs noted.

 

ABDOMEN:  Soft, nontender, nondistended.  He has positive bowel sounds in all 4 
quadrants.  There is very mild tenderness in the suprapubic region and inferior 
to the umbilicus.  There was no guarding or rebound noted.

 

:  He has a Bagley catheter inserted draining clear yellow urine.

 

MUSCULOSKELETAL:  There is no clubbing, no cyanosis.  He has no edema.  He has +
2 distal pulses palpable.  He is generally weak and has an inability to 
ambulate at this time.

 

NEUROLOGIC:  He is slow to speak, but he is entirely appropriate with no focal 
deficits.

 

PSYCHIATRIC:  Cooperative and appropriate.

 

 DIAGNOSTIC STUDIES/LAB DATA:  WBCs 8.8, RBCs 4.91, hemoglobin 16.2, hematocrit 
47, platelets 280.  Sodium 134, potassium 3.6, chloride 101, CO2 of 23, BUN 18, 
creatinine 0.91, GFR is 80.2, glucose 130, lactic acid 2.5, calcium 9.1.  Total 
bilirubin 1.50, AST 22, ALT 15, alk phos 76.  Troponin is negative at 0.00.  
CRP is less than 1.  BNP is 91.  Protein 7.6, albumin 4.5, globulin 3.1.  
Lipase is less than 10, amylase is 34.

 

Urinalysis shows straw, clear-colored urine, specific gravity of 1.005, 
negative protein, trace ketones, negative blood, positive nitrites, negative 
bilirubin, trace leukocyte esterase, 1+ bacteria, trace rbc's, trace wbc's.

 

Imaging:  CAT scan as noted above.

 

IMPRESSION:  This is an 78-year-old male patient who presents to the emergency 
department with weakness and abdominal pain.

 

1.  Abdominal pain.  We have ruled out with the CAT scan any obstruction, which 
he is high risk for given his multiple surgeries and abdominal adhesions.  
However, the patient is having normal bowel movements and is not actively 
vomiting.  Some of this abdominal pain may be radiating from his urinary 
retention and urinary tract infection.  We have placed him empirically on 
ceftriaxone, likely culprit right now is Escherichia coli, but we will continue 
to follow urine cultures.

2.  Urinary tract infection with urinary retention.  As above, Bagley catheter 
inserted.  Continue ceftriaxone.  The patient has also received IV fluids.  He 
is not meeting sepsis criteria, even though his lactic acid is mildly elevated.
  He had received a bolus in the ED.  We will recheck the lactic acid.  He is 
not tachycardic.  He has no fever and he is not hypotensive.

3.  History of coronary artery disease, currently stable.

4.  History of glaucoma.  Continue his eye drops.

5.  History of gastroesophageal reflux disease.  Continue PPI.

6.  History of neuropathy, on gabapentin, will be continued.

7.  DVT prophylaxis.  Heparin subcu.  He is high risk given age and debility.

8.  Diet.  He can have a heart-healthy diet as tolerated.

9.  Code status.  The patient is a full code.

 

The rest of the patient's course will be determined by further diagnostics, 
laboratories, any other input from other providers as warranted during this 
admission.

 

Disposition:  The patient will be admitted to observation.

 

This plan of care has been discussed with Dr. Amin, the attending of this case
, she is in agreement with the plan.

 

TIME SPENT:  Approximately 60 minutes interfacing with the patient, evaluating 
the chart, and determining plan of care.

 

 ____________________________________ TONG MORALES, RAMANA

 

[id number]/[facility] #: [id number]

REBEKAH

## 2019-02-08 NOTE — PN
Subjective


Date of Service: 02/08/19


Interval History: 





Mr. Hernández is feeling ok today. He reports that his abd pain is improved, but 

he still is having some epigastric pain. This waxes and wanes. He denies CP, SOB

, N/V, diaphoresis. He reports that his catheter is chronic, has been present 

for at least 6 months. Follows with Dr. Muhammad. Could not remember when his 

catheter was last changed, but nursing reports it was changed by Sabina on 2/4/ 19. 





Family History: Unchanged from Admission


Social History: Unchanged from Admission


Past Medical History: Unchanged from Admission





Objective


Active Medications: 





Acetaminophen (Tylenol Tab*)  650 mg PO Q4H PRN FEVER/PAIN


Aspirin (Aspirin 81 Mg Chew Tab*)  81 mg PO DAILY [facility]


Docusate Sodium (Colace Cap*)  100 mg PO BID PRN CONSTIPATION


Finasteride (Proscar Tab*)  5 mg PO DAILY [facility]


Gabapentin (Neurontin Cap(*))  900 mg PO TID [facility]


Heparin Sodium (Porcine) (Heparin Vial(*))  5,000 units SUBCUT Q8HR [facility]


Ceftriaxone Sodium 1 gm/ (Sodium Chloride)  50 mls @ 200 mls/hr IVPB Q24H [facility]


Sodium Chloride (Ns 0.9% 1000 Ml**)  1,000 mls @ 75 mls/hr IV PER RATE [facility]


Latanoprost (Xalatan 0.005%*)  1 drop RIGHT EYE QPM [facility]


Magnesium Hydroxide (Milk Of Magnesia Liq*)  30 ml PO Q6H PRN CONSTIPATION


Metoprolol Tartrate (Lopressor Tab*)  12.5 mg PO Q12HR [facility]


Morphine Sulfate (Morphine Vial*)  2 mg IV Q8H PRN abdominal pain


Ondansetron HCl (Zofran Inj*)  4 mg IV Q6H PRN NAUSEA


Pantoprazole Sodium (Protonix Tab*)  40 mg PO DAILY [facility]


Simethicone (Mylicon Tab*)  80 mg PO Q6H PRN gas pain


Tamsulosin HCl (Flomax Cap*)  0.4 mg PO DAILY [facility]


Timolol Maleate (Timoptic Ophth.Soln 0.25%)  1 drop RIGHT EYE BID [facility]





 Vital Signs - 8 hr











  02/08/19 02/08/19 02/08/19





  07:36 08:00 10:07


 


Temperature  98.6 F 


 


Pulse Rate  52 


 


Respiratory 18 16 16





Rate   


 


Blood Pressure  136/74 





(mmHg)   


 


O2 Sat by Pulse  97 





Oximetry   














  02/08/19 02/08/19





  11:22 12:54


 


Temperature 98.1 F 


 


Pulse Rate 58 


 


Respiratory 16 18





Rate  


 


Blood Pressure 136/77 





(mmHg)  


 


O2 Sat by Pulse 100 





Oximetry  











Oxygen Devices in Use Now: None


Appearance: Elderly male laying in bed in NAD


Eyes: No Scleral Icterus


Ears/Nose/Mouth/Throat: Mucous Membranes Moist


Neck: NL Appearance and Movements; NL JVP, Trachea Midline


Respiratory: Symmetrical Chest Expansion and Respiratory Effort, Clear to 

Auscultation


Cardiovascular: NL Sounds; No Murmurs; No JVD, RRR


Abdominal: NL Sounds; No Tenderness; No Distention


Extremities: No Edema


Skin: No Rash or Ulcers


Neurological: Alert and Oriented x 3


Lines/Tubes/Other Access: Clean, Dry and Intact Bagley, Clean, Dry and Intact 

Peripheral IV


Nutrition: Taking PO's


Result Diagrams: 


 02/08/19 06:57





 02/08/19 06:57





Assess/Plan/Problems-Billing





Assessment: 





Mr. Hernández is an 79 yo M with PMH of BPH with chronic Bagley for retention, 

CAD with stents, and glaucoma; who presented with c/o abd pain and was found to 

have a UTI without other acute findings.





- Patient Problems


(1) UTI (urinary tract infection)


Comment: 


- Catheter related, present on admission


- Follows with Dr. Muhammad and catheter was last changed on 2/4


- Urine culture growing Enterobacter cloacae with >100k colonies


- Continue ceftriaxone   





(2) Abdominal pain


Code(s): R10.9 - UNSPECIFIED ABDOMINAL PAIN   Comment: 


- Unclear etiology


- Epigastric, intermittent and nontender to palpation


- CT unremarkable for any acute findings


- Continue simethicone   





(3) BPH (benign prostatic hyperplasia)


Code(s): N40.0 - BENIGN PROSTATIC HYPERPLASIA WITHOUT LOWER URINRY TRACT SYMP   

Comment: 


- With chronic Bagley for retention


- Continue tamsulosin, finasteride   





(4) CAD (coronary artery disease)


Code(s): I25.10 - ATHSCL HEART DISEASE OF NATIVE CORONARY ARTERY W/O ANG PCTRS 

  Comment: 


- With history of stents


- Continue aspirin, metoprolol   





(5) GERD (gastroesophageal reflux disease)


Code(s): K21.9 - GASTRO-ESOPHAGEAL REFLUX DISEASE WITHOUT ESOPHAGITIS   Comment

: 


- Continue pantoprazole   





(6) Glaucoma


Code(s): H40.9 - UNSPECIFIED GLAUCOMA   Comment: 


- Continue latanoprost, timolol   





(7) DVT prophylaxis


Comment: 


- Heparin SQ   





(8) DNR (do not resuscitate)


Comment: 


   


Status and Disposition: 





Inpatient for IV antibiotics. Anticipate d/c home when medically stable, 

possibly over the weekend.





Attending: Michael Harris

## 2019-02-09 NOTE — PN
Subjective


Date of Service: 02/09/19


Interval History: 





Mr. Hernández feels better today. He reports that his abd pain has resolved. He 

did feel constipated and took some milk of mag. He felt like his abd pain 

improved after that, though he has not had a BM yet. He denies CP, SOB, N/V. 





Family History: Unchanged from Admission


Social History: Unchanged from Admission


Past Medical History: Unchanged from Admission





Objective


Active Medications: 





Acetaminophen (Tylenol Tab*)  650 mg PO Q4H PRN FEVER/PAIN


Aspirin (Aspirin 81 Mg Chew Tab*)  81 mg PO DAILY [facility]


Docusate Sodium (Colace Cap*)  100 mg PO BID PRN CONSTIPATION


Finasteride (Proscar Tab*)  5 mg PO DAILY [facility]


Gabapentin (Neurontin Cap(*))  900 mg PO TID [facility]


Heparin Sodium (Porcine) (Heparin Vial(*))  5,000 units SUBCUT Q8HR [facility]


Ceftriaxone Sodium 1 gm/ (Sodium Chloride)  50 mls @ 200 mls/hr IVPB Q24H [facility]


Sodium Chloride (Ns 0.9% 1000 Ml**)  1,000 mls @ 75 mls/hr IV PER RATE [facility]


Latanoprost (Xalatan 0.005%*)  1 drop RIGHT EYE QPM [facility]


Magnesium Hydroxide (Milk Of Magnesia Liq*)  30 ml PO Q6H PRN CONSTIPATION


Metoprolol Tartrate (Lopressor Tab*)  12.5 mg PO Q12HR [facility]


Morphine Sulfate (Morphine Vial*)  2 mg IV Q8H PRN abdominal pain


Ondansetron HCl (Zofran Inj*)  4 mg IV Q6H PRN NAUSEA


Pantoprazole Sodium (Protonix Tab*)  40 mg PO DAILY [facility]


Simethicone (Mylicon Tab*)  80 mg PO Q6H PRN gas pain


Tamsulosin HCl (Flomax Cap*)  0.4 mg PO DAILY [facility]


Timolol Maleate (Timoptic Ophth.Soln 0.25%)  1 drop RIGHT EYE BID [facility]





 Vital Signs - 8 hr











  02/09/19 02/09/19 02/09/19





  06:00 08:04 08:15


 


Temperature 98.8 F 98.2 F 


 


Pulse Rate  93 


 


Respiratory  22 20





Rate   


 


Blood Pressure  157/101 





(mmHg)   


 


O2 Sat by Pulse  96 





Oximetry   














  02/09/19 02/09/19





  10:52 11:57


 


Temperature  98.3 F


 


Pulse Rate  79


 


Respiratory 20 16





Rate  


 


Blood Pressure  131/81





(mmHg)  


 


O2 Sat by Pulse  98





Oximetry  











Oxygen Devices in Use Now: None


Appearance: Elderly male sitting in chair in NAD


Eyes: No Scleral Icterus


Ears/Nose/Mouth/Throat: Mucous Membranes Moist


Neck: NL Appearance and Movements; NL JVP, Trachea Midline


Respiratory: Symmetrical Chest Expansion and Respiratory Effort, Clear to 

Auscultation


Cardiovascular: NL Sounds; No Murmurs; No JVD, RRR


Abdominal: NL Sounds; No Tenderness; No Distention


Extremities: No Edema


Skin: No Rash or Ulcers


Neurological: - - Oriented to self and place, vaguely to time


Lines/Tubes/Other Access: Clean, Dry and Intact Bagley, Clean, Dry and Intact 

Peripheral IV


Nutrition: Taking PO's


Result Diagrams: 


 02/09/19 06:06





 02/09/19 06:06





Assess/Plan/Problems-Billing





Assessment: 





Mr. Hernández is an 79 yo M with PMH of BPH with chronic Bagley for retention, 

CAD with stents, and glaucoma; who presented with c/o abd pain and was found to 

have a UTI without other acute findings.





- Patient Problems


(1) UTI (urinary tract infection)


Comment: 


- Catheter related, present on admission


- Follows with Dr. Muhammad and catheter was last changed on 2/4


- Urine culture growing Enterobacter cloacae with >100k colonies


- Continue ceftriaxone   





(2) Abdominal pain


Code(s): R10.9 - UNSPECIFIED ABDOMINAL PAIN   Comment: 


- Resolved; possibly secondary to constipation


- CT unremarkable for any acute findings   





(3) BPH (benign prostatic hyperplasia)


Code(s): N40.0 - BENIGN PROSTATIC HYPERPLASIA WITHOUT LOWER URINRY TRACT SYMP   

Comment: 


- Chronic Bagley for retention


- Continue tamsulosin, finasteride   





(4) CAD (coronary artery disease)


Code(s): I25.10 - ATHSCL HEART DISEASE OF NATIVE CORONARY ARTERY W/O ANG PCTRS 

  Comment: 


- With history of stents


- Continue aspirin, metoprolol   





(5) GERD (gastroesophageal reflux disease)


Code(s): K21.9 - GASTRO-ESOPHAGEAL REFLUX DISEASE WITHOUT ESOPHAGITIS   Comment

: 


- Continue pantoprazole   





(6) Glaucoma


Code(s): H40.9 - UNSPECIFIED GLAUCOMA   Comment: 


- Continue latanoprost, timolol   





(7) DVT prophylaxis


Comment: 


- Heparin SQ   





(8) DNR (do not resuscitate)


Comment: 


   


Status and Disposition: 





Inpatient for IV antibiotics. Anticipate d/c home when medically stable, likely 

Monday.





Attending: Tigre Ferguson

## 2019-02-10 NOTE — PN
Subjective


Date of Service: 02/10/19


Interval History: 





Mr. Hernández is feeling ok today. He is frustrated that he is not able to 

ambulate independently and feels as though he will become deconditioned while 

being here. He denies any further abd pain. Denies CP, SOB, N/V. Appetite good. 

Had a low-grade fever of 100.3 last night.





Family History: Unchanged from Admission


Social History: Unchanged from Admission


Past Medical History: Unchanged from Admission





Objective


Active Medications: 





Acetaminophen (Tylenol Tab*)  650 mg PO Q4H PRN FEVER/PAIN


Aspirin (Aspirin 81 Mg Chew Tab*)  81 mg PO DAILY [facility]


Docusate Sodium (Colace Cap*)  100 mg PO BID PRN CONSTIPATION


Finasteride (Proscar Tab*)  5 mg PO DAILY [facility]


Gabapentin (Neurontin Cap(*))  900 mg PO TID [facility]


Heparin Sodium (Porcine) (Heparin Vial(*))  5,000 units SUBCUT Q8HR [facility]


Ceftriaxone Sodium 1 gm/ (Sodium Chloride)  50 mls @ 200 mls/hr IVPB Q24H [facility]


Latanoprost (Xalatan 0.005%*)  1 drop RIGHT EYE QPM [facility]


Magnesium Hydroxide (Milk Of Magnesia Liq*)  30 ml PO Q6H PRN CONSTIPATION


Metoprolol Tartrate (Lopressor Tab*)  12.5 mg PO Q12HR [facility]


Morphine Sulfate (Morphine Vial*)  2 mg IV Q8H PRN abdominal pain


Ondansetron HCl (Zofran Inj*)  4 mg IV Q6H PRN NAUSEA


Pantoprazole Sodium (Protonix Tab*)  40 mg PO DAILY [facility]


Simethicone (Mylicon Tab*)  80 mg PO Q6H PRN gas pain


Tamsulosin HCl (Flomax Cap*)  0.4 mg PO DAILY [facility]


Timolol Maleate (Timoptic Ophth.Soln 0.25%)  1 drop RIGHT EYE BID [facility]





 Vital Signs - 8 hr











  02/10/19 02/10/19 02/10/19





  07:37 08:00 09:15


 


Temperature 98.2 F  


 


Pulse Rate 82  


 


Respiratory 16 20 16





Rate   


 


Blood Pressure 149/89  





(mmHg)   


 


O2 Sat by Pulse 97  





Oximetry   














  02/10/19 02/10/19





  11:14 11:53


 


Temperature 98.6 F 


 


Pulse Rate 78 


 


Respiratory 18 16





Rate  


 


Blood Pressure 141/84 





(mmHg)  


 


O2 Sat by Pulse 96 





Oximetry  











Oxygen Devices in Use Now: None


Appearance: Elderly male sitting in chair in NAD


Eyes: No Scleral Icterus


Ears/Nose/Mouth/Throat: Mucous Membranes Moist


Neck: NL Appearance and Movements; NL JVP, Trachea Midline


Respiratory: Symmetrical Chest Expansion and Respiratory Effort, Clear to 

Auscultation


Cardiovascular: NL Sounds; No Murmurs; No JVD, RRR


Abdominal: NL Sounds; No Tenderness; No Distention


Extremities: No Edema


Skin: No Rash or Ulcers


Neurological: Alert and Oriented x 3


Lines/Tubes/Other Access: Clean, Dry and Intact Bagley, Clean, Dry and Intact 

Peripheral IV


Nutrition: Taking PO's


Result Diagrams: 


 02/09/19 06:06





 02/09/19 06:06





Assess/Plan/Problems-Billing





Assessment: 





Mr. Hernández is an 79 yo M with PMH of BPH with chronic Bagley for retention, 

CAD with stents, and glaucoma; who presented with c/o abd pain and was found to 

have a UTI without other acute findings.





- Patient Problems


(1) UTI (urinary tract infection)


Comment: 


- Catheter related, present on admission


- Follows with Dr. Muhammad and catheter was last changed on 2/4


- Low grade fever last night


- Urine culture growing Enterobacter cloacae with >100k colonies


- Continue ceftriaxone (day 4/10)   





(2) Abdominal pain


Code(s): R10.9 - UNSPECIFIED ABDOMINAL PAIN   Comment: 


- Resolved; possibly secondary to constipation


- CT unremarkable for any acute findings   





(3) BPH (benign prostatic hyperplasia)


Code(s): N40.0 - BENIGN PROSTATIC HYPERPLASIA WITHOUT LOWER URINRY TRACT SYMP   

Comment: 


- Chronic Bagley for retention


- Continue tamsulosin, finasteride   





(4) CAD (coronary artery disease)


Code(s): I25.10 - ATHSCL HEART DISEASE OF NATIVE CORONARY ARTERY W/O ANG PCTRS 

  Comment: 


- With history of stents


- Continue aspirin, metoprolol   





(5) GERD (gastroesophageal reflux disease)


Code(s): K21.9 - GASTRO-ESOPHAGEAL REFLUX DISEASE WITHOUT ESOPHAGITIS   Comment

: 


- Continue pantoprazole   





(6) Glaucoma


Code(s): H40.9 - UNSPECIFIED GLAUCOMA   Comment: 


- Continue latanoprost, timolol   





(7) DVT prophylaxis


Comment: 


- Heparin SQ   





(8) DNR (do not resuscitate)


Comment: 


   


Status and Disposition: 





Inpatient for IV antibiotics. Anticipate d/c home when medically stable, likely 

Monday.





Attending: Tigre Ferguson

## 2019-02-10 NOTE — PN
Hospitalist Progress Note


Date of Service: 02/10/19





Nurse reports fever, patient had received tylenol, but having fever again, will 

give ibuprofen.


Get blood cultures, chest xray.

## 2019-02-11 NOTE — PN
Subjective


Date of Service: 19


Interval History: 





Patient seen and examined this AM, states he wants to walk more. Feels like he 

wants to exercise. States he did not feel any changes when he had low grade 

fever overnight - no associate chills or rigors. Currently no fever, no chest 

pain, no SOB, no further complaints; however, approx 1/2 after seeing the 

patient, the primary RN called and stated the patient became hypotensive and 

unresponsive for 2-3 minutes, with SBP86 and was not responding to sternal rub. 





Upon re-evaluation, the patient is now awake, pale and diaphoretic. Does not 

endorse any further symptoms. There is some concern however, that two bottles 

of the patient's home meds were on the table, metoprolol and gabapentin. The 

patient does not recollect events clearly and nursing staff does not know where 

the two home meds came from. 


Family History: Unchanged from Admission


Social History: Unchanged from Admission


Past Medical History: Unchanged from Admission





Objective


Active Medications: 








Acetaminophen (Tylenol Tab*)  650 mg PO Q4H PRN


   PRN Reason: FEVER/PAIN


   Last Admin: 02/10/19 21:44 Dose:  650 mg


Aspirin (Aspirin 81 Mg Chew Tab*)  81 mg PO DAILY [facility]


   Last Admin: 19 09:42 Dose:  81 mg


Docusate Sodium (Colace Cap*)  100 mg PO BID PRN


   PRN Reason: CONSTIPATION


   Last Admin: 02/10/19 09:33 Dose:  100 mg


Finasteride (Proscar Tab*)  5 mg PO DAILY [facility]


   Last Admin: 19 09:42 Dose:  5 mg


Heparin Sodium (Porcine) (Heparin Vial(*))  5,000 units SUBCUT Q8HR [facility]


   Last Admin: 19 05:57 Dose:  5,000 units


Ceftriaxone Sodium 1 gm/ (Sodium Chloride)  50 mls @ 200 mls/hr IVPB Q24H [facility]


   Last Admin: 02/10/19 21:39 Dose:  200 mls/hr


Latanoprost (Xalatan 0.005%*)  1 drop RIGHT EYE QPM [facility]


   Last Admin: 02/10/19 16:16 Dose:  1 drop


Magnesium Hydroxide (Milk Of Magnesia Liq*)  30 ml PO Q6H PRN


   PRN Reason: CONSTIPATION


   Last Admin: 19 09:49 Dose:  30 ml


Morphine Sulfate (Morphine Vial*)  2 mg IV Q8H PRN


   PRN Reason: abdominal pain


Ondansetron HCl (Zofran Inj*)  4 mg IV Q6H PRN


   PRN Reason: NAUSEA


   Last Admin: 19 23:54 Dose:  4 mg


Pantoprazole Sodium (Protonix Tab*)  40 mg PO DAILY [facility]


   Last Admin: 19 09:41 Dose:  40 mg


Simethicone (Mylicon Tab*)  80 mg PO Q6H PRN


   PRN Reason: gas pain


Tamsulosin HCl (Flomax Cap*)  0.4 mg PO DAILY [facility]


   Last Admin: 19 09:42 Dose:  0.4 mg


Timolol Maleate (Timoptic Ophth.Soln 0.25%)  1 drop RIGHT EYE BID [facility]


   Last Admin: 19 09:44 Dose:  1 drop








 Vital Signs - 8 hr











  19





  07:48 09:40 10:38


 


Temperature 98.5 F  


 


Pulse Rate 78  


 


Respiratory 18 16 16





Rate   


 


Blood Pressure 147/75  





(mmHg)   


 


O2 Sat by Pulse 96  





Oximetry   











Oxygen Devices in Use Now: None


Appearance: alert, pale


Eyes: PERRLA


Ears/Nose/Mouth/Throat: Mucous Membranes Moist


Neck: NL Appearance and Movements; NL JVP


Respiratory: Symmetrical Chest Expansion and Respiratory Effort, Clear to 

Auscultation


Cardiovascular: NL Sounds; No Murmurs; No JVD, RRR, No Edema


Abdominal: NL Sounds; No Tenderness; No Distention


Skin: No Rash or Ulcers, - - diaphoretic


Neurological: - - A&Ox2


Nutrition: Taking PO's


Result Diagrams: 


 19 06:06





 19 06:06


Microbiology and Other Data: 


 Microbiology











 19 19:44 Urine Culture - Final





 Urine    Enterobacter Cloacae





    Normal Emani











Diagnostic Imaging: 





Patient Name:          DYANA HERNÁNDEZ                                          

                              Medical Record#: [id number]


Ordering Physician: Janett Amin MD                                          

                              Acct.#: [id number]


:     1940           Age: 78   Sex: M                                 

                              Location: [address] [place] [address] - MEDICAL


Exam Date: 02/10/19 2346                                                       

                              ADM Status: ADM IN





Order Information:                            CHEST AP OR PORT


Accession Number:                             [id number]


CPT:                                          27584


HISTORY: fever


COMPARISONS: 2018 





VIEWS: 1: frontal AP view of the chest at 12:00 AM 





FINDINGS:


LINES AND TUBES: None.


CARDIOMEDIASTINAL SILHOUETTE: The cardiomediastinal silhouette is normal for 

portable


technique.


PLEURA: The costophrenic angles are sharp. No pleural abnormalities are noted.


LUNG PARENCHYMA: The lungs are clear.


ABDOMEN: The upper abdomen is clear. There is no subphrenic gas.


BONES AND SOFT TISSUES: There is patchy alveolar opacification of the left 

lower lung. 





IMPRESSION: PATCHY ATELECTASIS VERSUS CONSOLIDATION OF THE LEFT LUNG BASE.





R1F 








Preliminary Imaging Read 


R1F                                                                        





____________________________________________________________


<Electronically signed by Robert Weber MD in OV>  19





Dictated By: Robert Weber MD


Dictated Date/Time: 19


Transcribed Date/Time: 19











Assess/Plan/Problems-Billing





Assessment: 





Mr. Hernández is an 79 yo M with PMH of BPH with chronic Bagley for retention, 

CAD with stents, and glaucoma; who presented with c/o abd pain and was found to 

have a UTI, also had fever overnight.





- Patient Problems


(1) Syncope


Code(s): R55 - SYNCOPE AND COLLAPSE   SNOMED Code(s): 572336435


   Comment: 


 - With associated hypotension, may be medication related


 - Check EKG, trop, CT head, place on tele, hold BB and gabapentin


 - Follow blood cultures   





(2) Fever


Code(s): R50.9 - FEVER, UNSPECIFIED   SNOMED Code(s): 943275648


   Comment: 


 - May be related to UTI vs atelectasis/consolidation per CXR above, but 

patient has no cough or respiratory symptoms


 - Continue ceftriaxone and monitor temps


 - Follow blood cultures drawn last night   





(3) UTI (urinary tract infection)


Comment: 


 - Catheter related, present on admission but with new fever last night


 - Follows with Dr. Muhammad and catheter was last changed on 


 - Urine culture growing Enterobacter cloacae with >100k colonies


 - Continue ceftriaxone (day 5/10)   





(4) BPH (benign prostatic hyperplasia)


Code(s): N40.0 - BENIGN PROSTATIC HYPERPLASIA WITHOUT LOWER URINRY TRACT SYMP   

SNOMED Code(s): 441918730


   Comment: 


 - Chronic Bagley for retention


 - Continue tamsulosin, finasteride   





(5) GERD (gastroesophageal reflux disease)


Code(s): K21.9 - GASTRO-ESOPHAGEAL REFLUX DISEASE WITHOUT ESOPHAGITIS   SNOMED 

Code(s): 413800182


   Comment: 


 - Continue pantoprazole   





(6) Glaucoma


Code(s): H40.9 - UNSPECIFIED GLAUCOMA   SNOMED Code(s): 53139001


   Comment: 


 - Continue latanoprost, timolol   





(7) CAD (coronary artery disease)


Code(s): I25.10 - ATHSCL HEART DISEASE OF NATIVE CORONARY ARTERY W/O ANG PCTRS 

  SNOMED Code(s): 06919169


   Comment: 


 - With history of stents


 - Continue aspirin, hold metoprolol in light of hypotention and possibility 

that patient took his own medications today unsupervised   





(8) HTN (hypertension)


Code(s): I10 - ESSENTIAL (PRIMARY) HYPERTENSION   SNOMED Code(s): 87503784


   Comment: 


 - Hold metoprolol today   





(9) Hypotension


Current Visit: Yes   Status: Acute   





(10) Vitamin B 12 deficiency


Code(s): E53.8 - DEFICIENCY OF OTHER SPECIFIED B GROUP VITAMINS   SNOMED Code(s)

: 975414138


   Comment: 


 - Concern with advancing difficulty with ambulation that level may be low again

, will check B12   





(11) DNR (do not resuscitate)


Comment: 


   


Status and Disposition: 





Inpatient for IV antibiotics. Anticipate d/c home vs [facility] when medically stable.

## 2019-02-12 NOTE — PN
Subjective


Date of Service: 19


Interval History: 





Patient seen and examined.No acute overnight events. No BP issues, patient 

denies SOB, no chest pain, no dizziness. Still feeling weak. Discussed B12 

deficiency at length, patient states he stopped taking B12 supplements, but 

doesn't remember when.


Family History: Unchanged from Admission


Social History: Unchanged from Admission


Past Medical History: Unchanged from Admission





Objective


Active Medications: 








Acetaminophen (Tylenol Tab*)  650 mg PO Q4H PRN


   PRN Reason: FEVER/PAIN


   Last Admin: 02/10/19 21:44 Dose:  650 mg


Aspirin (Aspirin 81 Mg Chew Tab*)  81 mg PO DAILY [facility]


   Last Admin: 19 09:17 Dose:  81 mg


Cyanocobalamin (Vitamin B12 Tab*)  500 mcg PO DAILY [facility]


   Last Admin: 19 09:17 Dose:  500 mcg


Docusate Sodium (Colace Cap*)  100 mg PO BID [facility]


   Last Admin: 19 09:17 Dose:  100 mg


Finasteride (Proscar Tab*)  5 mg PO DAILY [facility]


   Last Admin: 19 09:17 Dose:  5 mg


Heparin Sodium (Porcine) (Heparin Vial(*))  5,000 units SUBCUT Q8HR [facility]


   Last Admin: 19 14:44 Dose:  5,000 units


Ceftriaxone Sodium 1 gm/ (Sodium Chloride)  50 mls @ 200 mls/hr IVPB Q24H [facility]


   Last Admin: 19 21:04 Dose:  200 mls/hr


Latanoprost (Xalatan 0.005%*)  1 drop RIGHT EYE QPM [facility]


   Last Admin: 19 17:18 Dose:  1 drop


Magnesium Hydroxide (Milk Of Magnesia Liq*)  30 ml PO Q6H PRN


   PRN Reason: CONSTIPATION


   Last Admin: 19 09:49 Dose:  30 ml


Morphine Sulfate (Morphine Vial*)  2 mg IV Q8H PRN


   PRN Reason: abdominal pain


Ondansetron HCl (Zofran Inj*)  4 mg IV Q6H PRN


   PRN Reason: NAUSEA


   Last Admin: 19 23:54 Dose:  4 mg


Pantoprazole Sodium (Protonix Tab*)  40 mg PO DAILY [facility]


   Last Admin: 19 09:17 Dose:  40 mg


Simethicone (Mylicon Tab*)  80 mg PO Q6H PRN


   PRN Reason: gas pain


Tamsulosin HCl (Flomax Cap*)  0.4 mg PO DAILY [facility]


   Last Admin: 19 09:17 Dose:  0.4 mg


Timolol Maleate (Timoptic Ophth.Soln 0.25%)  1 drop RIGHT EYE BID [facility]


   Last Admin: 19 09:17 Dose:  1 drop








 Vital Signs - 8 hr











  19





  11:18 11:30


 


Temperature  99.3 F


 


Pulse Rate  80


 


Respiratory 16 20





Rate  


 


Blood Pressure  148/95





(mmHg)  


 


O2 Sat by Pulse  98





Oximetry  











Oxygen Devices in Use Now: None


Appearance: alert, NAD


Eyes: No Scleral Icterus, PERRLA, -


Ears/Nose/Mouth/Throat: NL Teeth, Lips, Gums, Mucous Membranes Moist


Neck: NL Appearance and Movements; NL JVP, Trachea Midline


Respiratory: Symmetrical Chest Expansion and Respiratory Effort, Clear to 

Auscultation


Cardiovascular: NL Sounds; No Murmurs; No JVD, RRR


Abdominal: NL Sounds; No Tenderness; No Distention


Extremities: No Edema, No Clubbing, Cyanosis


Skin: No Rash or Ulcers


Neurological: Alert and Oriented x 3, - - general weakness


Nutrition: Taking PO's


Result Diagrams: 


 19 06:06





 19 06:06


Microbiology and Other Data: 


 Microbiology











 19 19:44 Urine Culture - Final





 Urine    Enterobacter Cloacae





    Normal Emani











Diagnostic Imaging: 





Patient Name:          DYANA HERNÁNDEZ                                          

                              Medical Record#: [id number]


Ordering Physician: Janett Amin MD                                          

                              Acct.#: [id number]


:     1940           Age: 78   Sex: M                                 

                              Location: [address] [place] [address] - MEDICAL


Exam Date: 02/10/19 2346                                                       

                              ADM Status: ADM IN





Order Information:                            CHEST AP OR PORT


Accession Number:                             [id number]


CPT:                                          19249


HISTORY: fever


COMPARISONS: 2018 





VIEWS: 1: frontal AP view of the chest at 12:00 AM 





FINDINGS:


LINES AND TUBES: None.


CARDIOMEDIASTINAL SILHOUETTE: The cardiomediastinal silhouette is normal for 

portable


technique.


PLEURA: The costophrenic angles are sharp. No pleural abnormalities are noted.


LUNG PARENCHYMA: The lungs are clear.


ABDOMEN: The upper abdomen is clear. There is no subphrenic gas.


BONES AND SOFT TISSUES: There is patchy alveolar opacification of the left 

lower lung. 





IMPRESSION: PATCHY ATELECTASIS VERSUS CONSOLIDATION OF THE LEFT LUNG BASE.





R1F 








Preliminary Imaging Read 


R1F                                                                        





____________________________________________________________


<Electronically signed by Robert Weber MD in OV>  19





Dictated By: Rboert Weber MD


Dictated Date/Time: 19


Transcribed Date/Time: 19











Assess/Plan/Problems-Billing





Assessment: 





Mr. Hernández is an 79 yo M with PMH of BPH with chronic Bagley for retention, 

CAD with stents, and glaucoma; who presented with c/o abd pain and was found to 

have a UTI, also had fever overnight.





- Patient Problems


(1) Syncope


Code(s): R55 - SYNCOPE AND COLLAPSE   SNOMED Code(s): 209675043


   Comment: 


 - Symptoms resolved, suspect patient inadvertantly took his metoprolol and 

gabapentin from home meds


 - CT head, EKG negative, Blood cx NTD   





(2) Fever


Code(s): R50.9 - FEVER, UNSPECIFIED   SNOMED Code(s): 846755647


   Comment: 


 - Likely 2/2 entereobacter in urine vs atelectasis/consolidation per CXR above 

(but patient has no cough or respiratory symptoms)


 - Continue ceftriaxone per sensitivities, afebrile last 24 hours


   





(3) UTI (urinary tract infection)


Comment: 


 - Catheter related, present on admission


 - Follows with Dr. Muhammad and catheter was last changed on 


 - Urine culture growing Enterobacter cloacae with >100k colonies


 - Continue ceftriaxone (day 6/10)   





(4) BPH (benign prostatic hyperplasia)


Code(s): N40.0 - BENIGN PROSTATIC HYPERPLASIA WITHOUT LOWER URINRY TRACT SYMP   

SNOMED Code(s): 523692707


   Comment: 


 - Chronic Bagley for retention


 - Continue tamsulosin, finasteride   





(5) GERD (gastroesophageal reflux disease)


Code(s): K21.9 - GASTRO-ESOPHAGEAL REFLUX DISEASE WITHOUT ESOPHAGITIS   SNOMED 

Code(s): 853721234


   Comment: 


 - Continue pantoprazole   





(6) Glaucoma


Code(s): H40.9 - UNSPECIFIED GLAUCOMA   SNOMED Code(s): 01719284


   Comment: 


 - Continue latanoprost, timolol   





(7) CAD (coronary artery disease)


Code(s): I25.10 - ATHSCL HEART DISEASE OF NATIVE CORONARY ARTERY W/O ANG PCTRS 

  SNOMED Code(s): 70905246


   Comment: 


 - With history of stents


 - Continue aspirin


 - Restart metoprolol   





(8) HTN (hypertension)


Code(s): I10 - ESSENTIAL (PRIMARY) HYPERTENSION   SNOMED Code(s): 45865021


   Comment: 


 - Hold metoprolol today   





(9) Hypotension


Comment: 


 - Resolved   





(10) Vitamin B 12 deficiency


Code(s): E53.8 - DEFICIENCY OF OTHER SPECIFIED B GROUP VITAMINS   SNOMED Code(s)

: 478352790


   Comment: 


 - B12 deficient in the past with neurologic complications


 - Level is currently 70


 - Restarted supplementation yesterday, continue as an outpatient and follow 

levels   





(11) DNR (do not resuscitate)


Comment: 


   


Status and Disposition: 





Inpatient, pending acceptance to [facility], will be ready tomorrow.

## 2019-02-13 NOTE — DS
CC:  Dr. Woods; Dr. Muhammad *

 

DATE OF ADMISSION:  02/07/2019.

 

DATE OF DISCHARGE:  02/13/2019.

 

PRIMARY CARE PHYSICIAN:  Dr. Bruce Woods.

 

MY ATTENDING PHYSICIAN FOR TODAY:  Dr. Michael Harris * (dictated by Tong Reza NP).

 

HOSPITAL COURSE:  Please refer to admission history and physical.  In short, 
this is a patient who presented with emergency medical services from home with 
a complaint of abdominal pain.  The patient stated the pain at the time was 10/
10. He does have a history of urinary retention with a chronic Bagley.  He also 
has had some abdominal surgeries in the past resulting in adhesions; however, 
the patient states that he was not vomiting and he was having normal bowel 
movements.  He did have a CAT scan of the abdomen which showed no obstruction.  
The patient was noted to have a urinary tract infection that was present at 
admission, likely secondary to his Bagley.  The patient also had some persistent 
weakness and was dehydrated. He was admitted for IV antibiotics and IV 
hydration.

 

His past medical history is otherwise minimal.  He has coronary artery disease 
with stenting, glaucoma, and BPH with urinary retention.  The patient did 
respond well to antibiotics and IV fluids.  His urine culture did reveal 
Enterobacter cloacae which was sensitive to Ceftriaxone which is what he was 
continued on.  The patient did come in on Doxycycline, treatment dose at two 
times a day, likely from Dr. Muhammad who is his urologist.  However, given 
microbiology specimen, the patient will be discharged on Cefuroxime and will 
need continued follow-up.

 

Of significant note, the patient is for the most part alert and oriented.  I 
think he is forgetful at time.  We did have an incident where the nurse called 
me to the bedside because the patient was becoming unresponsive and 
hypotensive.  We did find a bottle of his Metoprolol from home in his room that 
was set up on his table.  The patient did not remember taking extra medications
; however, with just holding his Metoprolol for 24 hours, his hypotension 
resolved, he rebounded, and the patient was perfectly fine after that.  The 
patient has been hemodynamically stable since; however, he is generally very 
weak.  He does have a history of B12 deficiency in the past with some 
neurologic complications.  His B12 level remains low at 70.  He is getting 
outpatient B12 injections which should be continued.  However, he did received 
one supplemental injection of 1,000 mcg of B12 while an inpatient and then he 
was also started on oral supplementation.  This is something that should be 
followed closely as an outpatient to ensure most optimal treatment for this B12 
deficiency.

 

Physical therapy evaluated the patient and determined he would benefit from 
short- term rehab.  He has been accepted at [facility].  He will be transferred 
later on this afternoon.

 

DISCHARGE DIAGNOSES:

1.  Abdominal pain secondary to urinary tract infection and chronic urinary 
retention.

2.  History of coronary artery disease, stable.

3.  History of glaucoma, currently stable.

4.  History of GERD, currently stable.

5.  History of neuropathy, on Gabapentin.

6.  B12 deficiency, on supplementation.

7.  Hypotension, now resolved.

8.  History of BPH with chronic Bagley.

9.  Fever secondary to urinary tract infection, now resolved.

10. History of hypertension, currently normotensive.

 

DISCHARGE MEDICATIONS:

1.  Travatan eye drops right eye in the evening.

2.  Timolol eye drops for the right eye 2 times a day.

3.  Flomax 0.4 mg p.o. daily.

4.  Pilocarpine 1% ophthalmic solution to the left eye daily.

5.  Omeprazole 20 mg daily.

6.  Nystatin powder as needed.

7.  Metoprolol Tartrate 12.5 mg p.o. q.12 hours.

8.  Milk of Magnesia 30 ml q.6 hours as needed.

9.  Gabapentin 900 mg p.o. t.i.d.

10. Finasteride 5 mg p.o. daily.

11. Vitamin B12 injection 1,000 mcg monthly and 500 mg daily.

12. Benzonatate 100 mg p.o. t.i.d. as needed.

13. Aspirin 81 mg daily.

14. Tylenol 650 mg q.6 hours as needed.

15. Cefuroxime 500 mg p.o. b.i.d. times 5 more days.

16. Simethicone 80 mg p.o. q.6 hours as needed.

17. Docusate 100 mg p.o. b.i.d.

18. Tylenol 650 q.4 hours as needed.

 

REVIEW OF SYSTEMS THE DAY OF DISCHARGE:  The patient denies any fever, fatigue, 
or chills.  He does complain of some fatigue and some general weakness.  
Otherwise, he denies chest pain, no abdominal pain, no nausea, no vomiting, no 
shortness of breath, and no further constitutional complaints.

 

PHYSICAL EXAMINATION: MISTY lukeeral:  Well-appearing gentleman in no acute distress.
  Vital Signs:  Blood pressure is 154/95, heart rate 72, respiratory rate 17, 
O2 saturation 97 percent on room air, temperature 98.4.  HEENT:  Patient is 
atraumatic, normocephalic.  He has PERRLA with nonicteric sclerae.  His right 
eye does have some drooping at baseline and also discoloration of the iris 
secondary to his glaucoma, again at his baseline.  Oral mucosa is moist.  
Tongue is midline. Neck:  Supple, nontender.  No JVD noted, no carotid bruit is 
auscultated. Cardiovascular:  S1, S2 present.  No murmurs, gallops or rubs 
noted.  Lungs:  Clear bilaterally to auscultation with no wheezing, rhonchi, or 
rales.  Abdomen:  Soft, nontender, nondistended.  Positive bowel sounds all 
four quadrants.  :  He has a Bagley catheter draining clear yellow urine.  
Musculoskeletal:  There is no clubbing, no cyanosis, and no edema.  He has 
gross motor and sensation intact.  He does have some general weakness, 
ambulates with a walker.  Neurologic:  He is alert and oriented times three, 
although he can be sometimes forgetful.  He is otherwise grossly intact.  
Psychiatric:  He is cooperative and appropriate.

 

LABORATORY DATA:  WBC 11.1, RBC 4.45, hemoglobin 14.6, hematocrit 42, platelets 
218; sodium 136, potassium 3.8, chloride 105, CO2 23, BUN 11, creatinine 0.85, 
GFR 86.7, glucose 141, lactic acid 1.8 down from 3.2, calcium 8.9, troponin is 
negative at 0.00, and B12 70.

 

IMAGING STUDIES:

1.  CT of the abdomen and pelvis dated 02/07/2019 shows no CT findings to 
correlate with patient's symptomatology; recurrent anterior abdominal wall 
hernias, one containing fat and the other containing a partial loop of colon, 
but no obstruction or strangulation noted; a Bosniak type 1 renal cyst, no 
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 
stranding with a patent IVC; no enlarged lymph nodes noted.

2.  Chest x-ray dated 02/10/2019 shows patchy atelectasis versus consolidation 
of the left lung base.

 

DISPOSITION:  The patient has been accepted to [facility] for short-term rehab.  
He will be discharged at 5:00 p.m. today.

 

DIET:  Heart-healthy as tolerated.

 

ACTIVITY:  Progress as tolerated.

 

FOLLOW-UP:  The patient was instructed to follow-up with Dr. Bruce Woods, 
his primary care provider on an as needed basis after discharge from short-term 
rehab. He was also instructed to follow-up with Dr. Muhammad for catheter 
management and changes.  The patient was discharged in stable condition.  All 
questions were answered.  The patient stated understanding of his discharge 
instructions and follow-ups.

 

TIME SPENT:  Thirty-five minutes interfacing with the patient, case management, 
and other providers involved in this patient's care.

 

____________________________________ TONG REZA NP

 

[id number]/CPS #: [id number]

REBEKAH

## 2019-08-07 ENCOUNTER — HOSPITAL ENCOUNTER (EMERGENCY)
Dept: HOSPITAL 25 - ED | Age: 79
Discharge: HOME | End: 2019-08-07
Payer: MEDICARE

## 2019-08-07 VITALS — SYSTOLIC BLOOD PRESSURE: 165 MMHG | DIASTOLIC BLOOD PRESSURE: 100 MMHG

## 2019-08-07 DIAGNOSIS — E78.00: ICD-10-CM

## 2019-08-07 DIAGNOSIS — I25.2: ICD-10-CM

## 2019-08-07 DIAGNOSIS — Z88.8: ICD-10-CM

## 2019-08-07 DIAGNOSIS — T83.091A: Primary | ICD-10-CM

## 2019-08-07 DIAGNOSIS — I25.10: ICD-10-CM

## 2019-08-07 PROCEDURE — 99283 EMERGENCY DEPT VISIT LOW MDM: CPT

## 2019-08-07 NOTE — ED
GI/ HPI





- HPI Summary


HPI Summary: 





An 77 y/o male brought in by [facility] presents to [facility] with a chief 

complaint of a blockage in his rocha catheter causing some pain at 22:30 today. 

At triage he rated his pain as a 4/10 in severity. He denies any fevers. 





- History of Current Complaint


Chief Complaint: EDUrogenitalProblems


Time Seen by Provider: 08/07/19 00:54


Stated Complaint: CATHER ISSUE PER EMS


Hx Obtained From: Patient, EMS


Onset/Duration: Started Hours Ago, Still Present


Timing: Constant, Lasting Hours


Severity: Moderate


Current Severity: Moderate


Pain Intensity: 4 - out of 10


Location of Pain: Diffuse


Pain Characteristics: Unable to describe


Associated Signs and Symptoms: Negative: Fever





- Additional Pertinent History


Primary Care Physician: FELIX





- Allergy/Home Medications


Allergies/Adverse Reactions: 


 Allergies











Allergy/AdvReac Type Severity Reaction Status Date / Time


 


atorvastatin Allergy  Flushing Verified 03/15/19 08:25














PMH/Surg Hx/FS Hx/Imm Hx


Endocrine/Hematology History: 


   Denies: Hx Anticoagulant Therapy, Hx Diabetes


Cardiovascular History: Reports: Hx Coronary Artery Disease, Hx 

Hypercholesterolemia, Hx Myocardial Infarction


   Denies: Hx Hypertension, Hx Pacemaker/ICD


   Comment Only: Other Cardiovascular Problems/Disorders - MI


Respiratory History: 


   Denies: Hx Asthma, Hx Chronic Obstructive Pulmonary Disease (COPD), Hx Lung 

Cancer, Hx Pulmonary Embolism


GI History: Reports: Other GI Disorders - peptic ulcer disease


   Denies: Hx Gastroesophageal Reflux Disease


 History: Reports: Hx Benign Prostatic Hyperplasia


   Denies: Hx Renal Disease


   Comment Only: Other  Problems/Disorders - urine retention


Musculoskeletal History: Reports: Hx Back Problems


Sensory History: Reports: Hx Contacts or Glasses, Hx Legally Blind - Rt eye, Hx 

Hearing Problem


   Denies: Hx Cataracts, Hx Eye Injury, Hx Eye Prosthesis, Hx Glaucoma, Hx 

Macular Degeneration, Hx Deafness, Hx Hearing Aid


Opthamlomology History: Reports: Hx Contacts or Glasses, Hx Legally Blind - Rt 

eye


   Denies: Hx Cataracts, Hx Eye Injury, Hx Eye Prosthesis, Hx Glaucoma, Hx 

Macular Degeneration


Neurological History: Reports: Hx Headaches


Psychiatric History: 


   Denies: Hx Panic Disorder





- Surgical History


Surgery Procedure, Year, and Place: MI WITH STENT.  MULTIPLE HERNIA REPAIRS, l 

side rib repair, r femur rodding


Infectious Disease History: No


Infectious Disease History: 


   Denies: Hx Clostridium Difficile, Hx Hepatitis, Hx of Known/Suspected MRSA, 

Hx Shingles, Hx Tuberculosis, Hx Known/Suspected VRE, Traveled Outside the [place] 

in Last 30 Days





- Family History


Known Family History: Positive: Cardiac Disease, Hypertension





- Social History


Alcohol Use: None


Substance Use Type: Reports: None


Smoking Status (MU): Never Smoked Tobacco





Review of Systems


Negative: Fever


Positive: other - positive: blockage in rocha catheter


All Other Systems Reviewed And Are Negative: Yes





Physical Exam





- Summary


Physical Exam Summary: 





Constitutional: Well-developed, Well-nourished, Alert. (-) Distressed


Skin: Warm, Dry


HENT: Normocephalic; Atraumatic


Eyes: Conjunctiva normal


Neck: Musculoskeletal ROM normal neck. (-) JVD, (-) Stridor, (-) Tracheal 

deviation


Cardio: Rhythm regular, rate normal, Heart sounds normal; Intact distal pulses; 

The pedal pulses are 2+ and symmetric. Radial pulses are 2+ and symmetric. (-) 

Murmur


Pulmonary/Chest wall: Effort normal. (-) Respiratory distress, (-) Wheezes, (-) 

Rales


Abd: Soft, (-) tenderness, (-) Distension, (-) Guarding, (-) Rebound


Musculoskeletal: (-) Edema


Lymph: (-) Cervical adenopathy


Neuro: Alert, Oriented x3


Psych: Mood and affect Normal


Gu: Rocha Catheter in place


Triage Information Reviewed: Yes


Vital Signs On Initial Exam: 


 Initial Vitals











Temp Pulse Resp BP Pulse Ox


 


 98.0 F   84   18   206/156   98 


 


 08/07/19 00:45  08/07/19 00:45  08/07/19 00:45  08/07/19 00:45  08/07/19 00:45











Vital Signs Reviewed: Yes





Diagnostics





- Vital Signs


 Vital Signs











  Temp Pulse Resp BP Pulse Ox


 


 08/07/19 00:45  98.0 F  84  18  206/156  98














- Laboratory


Lab Statement: Any lab studies that have been ordered have been reviewed, and 

results considered in the medical decision making process.





GIGU Course/Dx





- Course


Course Of Treatment: An 77 y/o male presents to [facility] with a chief complaint of 

a blockage in his rocha catheter causing some pain at 22:30 today. The rocha 

catheter was replaced by the nurse. The physical exam revealed a rocha catheter 

in place. The patient will be discharged home and follow up with his PCP. The 

patient is agreeable with this plan.





- Diagnoses


Provider Diagnoses: 


 Obstructed Rocha catheter








Discharge





- Sign-Out/Discharge


Documenting (check all that apply): Patient Departure


Patient Received Moderate/Deep Sedation with Procedure: No





- Discharge Plan


Condition: Stable


Disposition: HOME


Patient Education Materials:  Rocha Catheter Placement and Care (ED)


Print Language: ENGLISH


Referrals: 


Bruce Woods MD [Primary Care Provider] - 





- Billing Disposition and Condition


Condition: STABLE


Disposition: Home





- Attestation Statements


Document Initiated by Scribe: Yes


Documenting Scribe: Janes Hernandez


Provider For Whom Angie is Documenting (Include Credential): Claudia Medrano MD


Scribe Attestation: 


I, Janes Hernandez, scribed for Claudia Mitchell MD on 08/07/19 at 

0431. 


Scribe Documentation Reviewed: Yes


Provider Attestation: 


The documentation as recorded by the Janes pa accurately 

reflects the service I personally performed and the decisions made by me, 

Claudia Mitchell MD


Status of Scribe Document: Viewed

## 2019-09-29 ENCOUNTER — HOSPITAL ENCOUNTER (EMERGENCY)
Dept: HOSPITAL 25 - ED | Age: 79
Discharge: HOME | End: 2019-09-29
Payer: MEDICARE

## 2019-09-29 VITALS — SYSTOLIC BLOOD PRESSURE: 164 MMHG | DIASTOLIC BLOOD PRESSURE: 95 MMHG

## 2019-09-29 DIAGNOSIS — I25.2: ICD-10-CM

## 2019-09-29 DIAGNOSIS — Z95.5: ICD-10-CM

## 2019-09-29 DIAGNOSIS — E78.00: ICD-10-CM

## 2019-09-29 DIAGNOSIS — Z79.899: ICD-10-CM

## 2019-09-29 DIAGNOSIS — N39.0: Primary | ICD-10-CM

## 2019-09-29 DIAGNOSIS — N40.0: ICD-10-CM

## 2019-09-29 DIAGNOSIS — Z96.0: ICD-10-CM

## 2019-09-29 DIAGNOSIS — Z79.82: ICD-10-CM

## 2019-09-29 DIAGNOSIS — I25.10: ICD-10-CM

## 2019-09-29 LAB
RBC UR QL AUTO: (no result)
WBC UR QL AUTO: (no result)

## 2019-09-29 PROCEDURE — 87186 SC STD MICRODIL/AGAR DIL: CPT

## 2019-09-29 PROCEDURE — 87086 URINE CULTURE/COLONY COUNT: CPT

## 2019-09-29 PROCEDURE — 81003 URINALYSIS AUTO W/O SCOPE: CPT

## 2019-09-29 PROCEDURE — 81015 MICROSCOPIC EXAM OF URINE: CPT

## 2019-09-29 PROCEDURE — 87077 CULTURE AEROBIC IDENTIFY: CPT

## 2019-09-29 PROCEDURE — 99282 EMERGENCY DEPT VISIT SF MDM: CPT

## 2019-09-29 NOTE — ED
GI/ HPI





- HPI Summary


HPI Summary: 


79 year male presents with a catheter pain today.  He states he has had a 

urinary catheter for the past year.  He states now once a month for the past 3 

month he seen to need it changed.  He states that it hasn't walked for 3 hours.

  He states a lot of bladder pressure.  He states has not been draining.  No 

fevers.  No chills.  No nausea vomiting.  Denies any chest pain or shortness of 

breath.  Has no other symptoms.  See Dr. Muhammad for urology.  





- History of Current Complaint


Chief Complaint: EDUrogenitalProblems


Time Seen by Provider: 09/29/19 18:42


Stated Complaint: BLOCKED CATH PER PT


Pain Intensity: 3





- Additional Pertinent History


Primary Care Physician: [id number]





- Allergy/Home Medications


Allergies/Adverse Reactions: 


 Allergies











Allergy/AdvReac Type Severity Reaction Status Date / Time


 


No Known Allergies Allergy   Verified 09/29/19 17:40














PMH/Surg Hx/FS Hx/Imm Hx


Endocrine/Hematology History: 


   Denies: Hx Anticoagulant Therapy, Hx Diabetes


Cardiovascular History: Reports: Hx Coronary Artery Disease, Hx 

Hypercholesterolemia, Hx Myocardial Infarction


   Denies: Hx Hypertension, Hx Pacemaker/ICD


   Comment Only: Other Cardiovascular Problems/Disorders - MI


Respiratory History: 


   Denies: Hx Asthma, Hx Chronic Obstructive Pulmonary Disease (COPD), Hx Lung 

Cancer, Hx Pulmonary Embolism


GI History: Reports: Other GI Disorders - peptic ulcer disease


   Denies: Hx Gastroesophageal Reflux Disease


 History: Reports: Hx Benign Prostatic Hyperplasia


   Denies: Hx Renal Disease


   Comment Only: Other  Problems/Disorders - urine retention


Musculoskeletal History: Reports: Hx Back Problems


Sensory History: Reports: Hx Contacts or Glasses, Hx Legally Blind - Rt eye, Hx 

Hearing Problem


   Denies: Hx Cataracts, Hx Eye Injury, Hx Eye Prosthesis, Hx Glaucoma, Hx 

Macular Degeneration, Hx Deafness, Hx Hearing Aid


Opthamlomology History: Reports: Hx Contacts or Glasses, Hx Legally Blind - Rt 

eye


   Denies: Hx Cataracts, Hx Eye Injury, Hx Eye Prosthesis, Hx Glaucoma, Hx 

Macular Degeneration


Neurological History: Reports: Hx Headaches


Psychiatric History: 


   Denies: Hx Panic Disorder





- Surgical History


Surgery Procedure, Year, and Place: MI WITH STENT.  MULTIPLE HERNIA REPAIRS, l 

side rib repair, r femur rodding


Infectious Disease History: No


Infectious Disease History: 


   Denies: Hx Clostridium Difficile, Hx Hepatitis, Hx of Known/Suspected MRSA, 

Hx Shingles, Hx Tuberculosis, Hx Known/Suspected VRE, Traveled Outside the [place] 

in Last 30 Days





- Family History


Known Family History: Positive: Cardiac Disease, Hypertension





- Social History


Alcohol Use: None


Substance Use Type: Reports: None


Smoking Status (MU): Never Smoked Tobacco





Review of Systems


Negative: Fever


Negative: Chest Pain


Negative: Shortness Of Breath


Positive: Abdominal Pain


All Other Systems Reviewed And Are Negative: Yes





Physical Exam


Triage Information Reviewed: Yes


Vital Signs On Initial Exam: 


 Initial Vitals











Temp Pulse Resp BP Pulse Ox


 


 98.5 F   78   16   169/123   95 


 


 09/29/19 17:37  09/29/19 17:37  09/29/19 17:37  09/29/19 17:37  09/29/19 17:37











Vital Signs Reviewed: Yes


Appearance: Positive: Well-Appearing


Skin: Positive: Warm, Dry


Head/Face: Positive: Normal Head/Face Inspection


Eyes: Positive: Normal, Conjunctiva Clear


ENT: Positive: Pharynx normal


Respiratory/Lung Sounds: Positive: Clear to Auscultation, Breath Sounds Present


Cardiovascular: Positive: Normal, RRR


Abdomen Description: Positive: Nontender, Soft


Bowel Sounds: Positive: Present


Musculoskeletal: Positive: Normal


Neurological: Positive: Normal


Psychiatric: Positive: Normal





Diagnostics





- Vital Signs


 Vital Signs











  Temp Pulse Resp BP Pulse Ox


 


 09/29/19 17:37  98.5 F  78  16  169/123  95














- Laboratory


Lab Results: 


 Lab Results











  09/29/19 Range/Units





  18:50 


 


Urine Color  Yellow  


 


Urine Appearance  Cloudy  


 


Urine pH  8.0  (5-9)  


 


Ur Specific Gravity  1.013  (1.010-1.030)  


 


Urine Protein  2+(100 mg/dl) A  (Negative)  


 


Urine Ketones  Negative  (Negative)  


 


Urine Blood  2+ A  (Negative)  


 


Urine Nitrate  Positive A  (Negative)  


 


Urine Bilirubin  Negative  (Negative)  


 


Urine Urobilinogen  Negative  (Negative)  


 


Ur Leukocyte Esterase  3+ A  (Negative)  


 


Urine WBC (Auto)  3+(>20/hpf) A  (Absent)  


 


Urine RBC (Auto)  3+(>10/hpf) A  (Absent)  


 


Urine Bacteria  1+ A  (Absent)  


 


Urine Glucose  Negative  (Negative)  











Lab Statement: Any lab studies that have been ordered have been reviewed, and 

results considered in the medical decision making process.





GIGU Course/Dx





- Course


Course Of Treatment: 79 year male presents with a catheter pain today.  He 

states he has had a urinary catheter for the past year.  He states now once a 

month for the past 3 month he seen to need it changed.  He states that it hasn'

t walked for 3 hours.  He states a lot of bladder pressure.  He states has not 

been draining.  No fevers.  No chills.  No nausea vomiting.  Denies any chest 

pain or shortness of breath.  Has no other symptoms.  See Dr. Muhammad for 

urology.  On exam nontender abdomen after catheter was changed.  Urine shows a 

potential UTI so will treat with Bactrim.  Told to follow urology.  Patient 

understands and agrees with plan.





- Diagnoses


Differential Diagnoses - Male: Pyelonephritis, Urinary Tract Infection, Other - 

catheter replacement


Provider Diagnoses: 


 UTI (urinary tract infection), Bagley catheter in place








Discharge ED





- Sign-Out/Discharge


Documenting (check all that apply): Patient Departure


Patient Received Moderate/Deep Sedation with Procedure: No





- Discharge Plan


Condition: Good


Disposition: HOME


Prescriptions: 


Sulfamethox/Trimethoprim DS* [Bactrim /160 TAB*] 1 tab PO BID #9 tab


Patient Education Materials:  Bagley Catheter Placement and Care (ED)


Referrals: 


Bruce Woods MD [Primary Care Provider] - 


Richar Muhammad MD [Medical Doctor] - 


Additional Instructions: 


take bactrim twice a day for 5 days


follow up with urology


Return to ED if develop any fever or any new or worsening symptoms





- Billing Disposition and Condition


Condition: GOOD


Disposition: Home

## 2019-10-07 ENCOUNTER — HOSPITAL ENCOUNTER (EMERGENCY)
Dept: HOSPITAL 25 - UCCORT | Age: 79
Discharge: ANOTHER HOSPITAL | End: 2019-10-07
Payer: MEDICARE

## 2019-10-07 ENCOUNTER — HOSPITAL ENCOUNTER (OUTPATIENT)
Dept: HOSPITAL 25 - ED | Age: 79
Setting detail: OBSERVATION
LOS: 1 days | Discharge: HOME | End: 2019-10-08
Attending: INTERNAL MEDICINE | Admitting: INTERNAL MEDICINE
Payer: MEDICARE

## 2019-10-07 VITALS — SYSTOLIC BLOOD PRESSURE: 126 MMHG | DIASTOLIC BLOOD PRESSURE: 82 MMHG

## 2019-10-07 DIAGNOSIS — H40.9: ICD-10-CM

## 2019-10-07 DIAGNOSIS — I25.2: ICD-10-CM

## 2019-10-07 DIAGNOSIS — I49.9: ICD-10-CM

## 2019-10-07 DIAGNOSIS — Z79.899: ICD-10-CM

## 2019-10-07 DIAGNOSIS — I10: ICD-10-CM

## 2019-10-07 DIAGNOSIS — E78.5: ICD-10-CM

## 2019-10-07 DIAGNOSIS — Z79.82: ICD-10-CM

## 2019-10-07 DIAGNOSIS — Z95.1: ICD-10-CM

## 2019-10-07 DIAGNOSIS — N39.0: ICD-10-CM

## 2019-10-07 DIAGNOSIS — R94.31: ICD-10-CM

## 2019-10-07 DIAGNOSIS — N40.0: ICD-10-CM

## 2019-10-07 DIAGNOSIS — R55: Primary | ICD-10-CM

## 2019-10-07 DIAGNOSIS — Z96.0: ICD-10-CM

## 2019-10-07 DIAGNOSIS — Z95.5: ICD-10-CM

## 2019-10-07 DIAGNOSIS — I25.10: ICD-10-CM

## 2019-10-07 DIAGNOSIS — Z87.891: ICD-10-CM

## 2019-10-07 LAB
ALBUMIN SERPL BCG-MCNC: 4.1 G/DL (ref 3.2–5.2)
ALBUMIN/GLOB SERPL: 1.5 {RATIO} (ref 1–3)
ALP SERPL-CCNC: 88 U/L (ref 34–104)
ALT SERPL W P-5'-P-CCNC: 9 U/L (ref 7–52)
ANION GAP SERPL CALC-SCNC: 6 MMOL/L (ref 2–11)
AST SERPL-CCNC: 12 U/L (ref 13–39)
BASOPHILS # BLD AUTO: 0 10^3/UL (ref 0–0.2)
BUN SERPL-MCNC: 16 MG/DL (ref 6–24)
BUN/CREAT SERPL: 15.8 (ref 8–20)
CALCIUM SERPL-MCNC: 8.8 MG/DL (ref 8.6–10.3)
CHLORIDE SERPL-SCNC: 105 MMOL/L (ref 101–111)
CK SERPL-CCNC: 82 U/L (ref 10–223)
EOSINOPHIL # BLD AUTO: 0 10^3/UL (ref 0–0.6)
GLOBULIN SER CALC-MCNC: 2.7 G/DL (ref 2–4)
GLUCOSE SERPL-MCNC: 86 MG/DL (ref 70–100)
HCO3 SERPL-SCNC: 26 MMOL/L (ref 22–32)
HCT VFR BLD AUTO: 41 % (ref 42–52)
HGB BLD-MCNC: 13.8 G/DL (ref 14–18)
LYMPHOCYTES # BLD AUTO: 1.2 10^3/UL (ref 1–4.8)
MAGNESIUM SERPL-MCNC: 1.9 MG/DL (ref 1.9–2.7)
MCH RBC QN AUTO: 31 PG (ref 27–31)
MCHC RBC AUTO-ENTMCNC: 33 G/DL (ref 31–36)
MCV RBC AUTO: 93 FL (ref 80–94)
MONOCYTES # BLD AUTO: 0.5 10^3/UL (ref 0–0.8)
NEUTROPHILS # BLD AUTO: 5.4 10^3/UL (ref 1.5–7.7)
NRBC # BLD AUTO: 0 10^3/UL
NRBC BLD QL AUTO: 0.2
PLATELET # BLD AUTO: 264 10^3/UL (ref 150–450)
POTASSIUM SERPL-SCNC: 4.2 MMOL/L (ref 3.5–5)
PROT SERPL-MCNC: 6.8 G/DL (ref 6.4–8.9)
RBC # BLD AUTO: 4.42 10^6 /UL (ref 4.18–5.48)
SODIUM SERPL-SCNC: 137 MMOL/L (ref 135–145)
TROPONIN I SERPL-MCNC: 0 NG/ML (ref ?–0.04)
TSH SERPL-ACNC: 1.41 MCIU/ML (ref 0.34–5.6)
WBC # BLD AUTO: 7.2 10^3/UL (ref 3.5–10.8)

## 2019-10-07 PROCEDURE — 83550 IRON BINDING TEST: CPT

## 2019-10-07 PROCEDURE — 36415 COLL VENOUS BLD VENIPUNCTURE: CPT

## 2019-10-07 PROCEDURE — 84484 ASSAY OF TROPONIN QUANT: CPT

## 2019-10-07 PROCEDURE — 93005 ELECTROCARDIOGRAM TRACING: CPT

## 2019-10-07 PROCEDURE — 83540 ASSAY OF IRON: CPT

## 2019-10-07 PROCEDURE — 87086 URINE CULTURE/COLONY COUNT: CPT

## 2019-10-07 PROCEDURE — 82746 ASSAY OF FOLIC ACID SERUM: CPT

## 2019-10-07 PROCEDURE — 87077 CULTURE AEROBIC IDENTIFY: CPT

## 2019-10-07 PROCEDURE — G0463 HOSPITAL OUTPT CLINIC VISIT: HCPCS

## 2019-10-07 PROCEDURE — 81015 MICROSCOPIC EXAM OF URINE: CPT

## 2019-10-07 PROCEDURE — 80048 BASIC METABOLIC PNL TOTAL CA: CPT

## 2019-10-07 PROCEDURE — 80320 DRUG SCREEN QUANTALCOHOLS: CPT

## 2019-10-07 PROCEDURE — 99284 EMERGENCY DEPT VISIT MOD MDM: CPT

## 2019-10-07 PROCEDURE — 93880 EXTRACRANIAL BILAT STUDY: CPT

## 2019-10-07 PROCEDURE — 82550 ASSAY OF CK (CPK): CPT

## 2019-10-07 PROCEDURE — 83605 ASSAY OF LACTIC ACID: CPT

## 2019-10-07 PROCEDURE — 99213 OFFICE O/P EST LOW 20 MIN: CPT

## 2019-10-07 PROCEDURE — G0480 DRUG TEST DEF 1-7 CLASSES: HCPCS

## 2019-10-07 PROCEDURE — 83735 ASSAY OF MAGNESIUM: CPT

## 2019-10-07 PROCEDURE — 82607 VITAMIN B-12: CPT

## 2019-10-07 PROCEDURE — 82140 ASSAY OF AMMONIA: CPT

## 2019-10-07 PROCEDURE — 80307 DRUG TEST PRSMV CHEM ANLYZR: CPT

## 2019-10-07 PROCEDURE — 83880 ASSAY OF NATRIURETIC PEPTIDE: CPT

## 2019-10-07 PROCEDURE — 71046 X-RAY EXAM CHEST 2 VIEWS: CPT

## 2019-10-07 PROCEDURE — 70450 CT HEAD/BRAIN W/O DYE: CPT

## 2019-10-07 PROCEDURE — 87186 SC STD MICRODIL/AGAR DIL: CPT

## 2019-10-07 PROCEDURE — 93306 TTE W/DOPPLER COMPLETE: CPT

## 2019-10-07 PROCEDURE — G0378 HOSPITAL OBSERVATION PER HR: HCPCS

## 2019-10-07 PROCEDURE — 81003 URINALYSIS AUTO W/O SCOPE: CPT

## 2019-10-07 PROCEDURE — 85025 COMPLETE CBC W/AUTO DIFF WBC: CPT

## 2019-10-07 PROCEDURE — 96372 THER/PROPH/DIAG INJ SC/IM: CPT

## 2019-10-07 PROCEDURE — 80053 COMPREHEN METABOLIC PANEL: CPT

## 2019-10-07 PROCEDURE — 84443 ASSAY THYROID STIM HORMONE: CPT

## 2019-10-07 NOTE — UC
Syncope/New Syncope HPI





- HPI Summary


HPI Summary: 


79-year-old male presents with reports of syncopal episode that occurred at 

approximately 2:00 PM this afternoon.  States he was sitting at a table playing 

bingo when he suddenly felt as if he was going to pass out.  His friends who 

were with him state that he then became unresponsive for approximately 3-10 

minutes.  No seizure-like activity was witnessed.  Friends state that he was 

cold and diaphoretic.  When patient regained consciousness he appeared to be at 

his baseline mental status.  EMS was contacted and evaluated him however the 

patient reports refused transport to the emergency room at that time.  Patient 

has extensive cardiac history including an MI and cardiac stents.  Patient 

states that he has had irregular heart rhythms in the past but is unsure of 

what type of arrhythmia. Denies chest pain, palpitations, headache, visual 

disturbances, facial droop, slurred or difficulty speaking, numbness, tingling, 

or weakness of the arms or legs.








- History Of Current Complaint


Stated Complaint: LOSS OF CONSCIOUSNESS


Time Seen by Provider: 10/07/19 16:21


Hx Obtained From: Patient


Pain Intensity: 0





- Allergies/Home Medications


Allergies/Adverse Reactions: 


 Allergies











Allergy/AdvReac Type Severity Reaction Status Date / Time


 


No Known Allergies Allergy   Verified 10/07/19 16:21














PMH/Surg Hx/FS Hx/Imm Hx





- Additional Past Medical History


Additional PMH: 


Glaucoma





Endocrine History: Dyslipidemia


Cardiovascular History: Cardiac Disease, Hypertension, Myocardial Infarction


GI/ History: Gastroesophageal Reflux, Other - BPH


Other History Of: 


   Negative For: Anticoagulant Therapy





- Surgical History


Surgical History: Yes


Surgery Procedure, Year, and Place: MI WITH STENT.  MULTIPLE HERNIA REPAIRS, 

left side rib repair,.  right femur rodding





- Family History


Known Family History: Positive: Cardiac Disease, Hypertension





- Social History


Occupation: Retired


Lives: Assisted Living


Alcohol Use: None


Substance Use Type: None


Smoking Status (MU): Never Smoked Tobacco





- Immunization History


Most Recent Influenza Vaccination: unknown


Most Recent Tetanus Shot: unknown


Most Recent Pneumonia Vaccination: unknown





Review of Systems


All Other Systems Reviewed And Are Negative: Yes


Constitutional: Negative: Fever, Chills


Eyes: Negative: Blurred Vision, Diplopia, Photophobia


ENT: Positive: Negative


Respiratory: Negative: Shortness Of Breath, Cough


Cardiovascular: Negative: Palpitations, Chest Pain


Gastrointestinal: Negative: Abdominal Pain, Vomiting, Diarrhea, Nausea


Genitourinary: Positive: Negative


Musculoskeletal: Positive: Negative


Neurological: Negative: Headache, Weakness, Paresthesia, Numbness


Is Patient Immunocompromised?: No





Physical Exam





- Summary


Physical Exam Summary: 


GENERAL APPEARANCE: Older adult male who is awake, alert and oriented x 4 and 

appears to be in no acute distress.





CARDIAC: Normal S1 and S2. Irregularly irregular rhythm. There is no peripheral 

edema, cyanosis or pallor. Extremities are warm and well perfused. Capillary 

refill is less than 2 seconds. Peripheral pulses intact.





LUNGS: Clear to auscultation without rales, rhonchi, wheezing or diminished 

breath sounds.





ABDOMEN: Positive bowel sounds. Soft, nondistended, nontender. No guarding or 

rebound. No masses or hepatosplenomegally.





MUSKULOSKELETAL: ROM intact to all extremities. No joint erythema or 

tenderness. Normal muscular development.





NEUROLOGICAL: CN II-XII intact. Strength and sensation symmetric and intact 

throughout. Reflexes 2+ throughout.





SKIN: Skin normal color, texture and turgor with no lesions or eruptions.





Triage Information Reviewed: Yes


Vital Signs: 


 Initial Vital Signs











Temp  97.7 F   10/07/19 16:31


 


Pulse  108   10/07/19 16:31


 


Resp  20   10/07/19 16:31


 


BP  150/82   10/07/19 16:31


 


Pulse Ox  98   10/07/19 16:31











Vital Signs Reviewed: Yes





Diagnostics





- EKG


Cardiac Rate: Tachycardia - Rate of 116


Cardiac Rhythm: Sinus: Normal - Possible 2nd degree HB


Ectopy: None


ST Segment: Normal


EKG Comparison: Other - No present in EKG from 2/11/2019





Syncope Course/Dx





- Course


Course Of Treatment: 


79-year-old male presents with reports of syncopal episode that occurred at 

approximately 2:00 PM this afternoon.  States he was sitting at a table playing 

bingo when he suddenly felt as if he was going to pass out.  His friends who 

were with him state that he then became unresponsive for approximately 3-10 

minutes.  No seizure-like activity was witnessed.  Friends state that he was 

cold and diaphoretic.  When patient regained consciousness he appeared to be at 

his baseline mental status.  EMS was contacted and evaluated him however the 

patient reports refused transport to the emergency room at that time.  Patient 

has extensive cardiac history including an MI and cardiac stents.  Patient 

states that he has had irregular heart rhythms in the past but is unsure of 

what type of arrhythmia. Denies chest pain, palpitations, headache, visual 

disturbances, facial droop, slurred or difficulty speaking, numbness, tingling, 

or weakness of the arms or legs.  Afebrile.  Hypertensive and tachycardic with 

otherwise stable vital signs.  Patient was neurologically intact and without 

complaints at the time of evaluation.  He was noted to have an irregularly 

irregular heart rate and otherwise unremarkable exam.  Twelve-lead EKG showed a 

sinus tachycardia with an irregular rhythm.  There is some question as to 

whether there may be some increase in the AL interval with a dropped QRS 

complex suggestive of second-degree heart block which was not present in his 

old EKG from 2/11/2019.  No ST elevation or ectopy was noted.  Based on the EKG 

changes and his history of syncope I am recommending evaluated in the emergency 

room at this time with transport via EMS.  Patient is agreeable to this.  Case 

was discussed with MAYUR Miranda at [facility] emergency room.








- Differential Dx/Diagnosis


Differential Diagnosis/HQI/PQRI: Cerebral Vascular Accident, Coronary Artery 

Disease, Dysrhythmia, Seizure, Transient Ischemic Attack, Vasovagal Episode


Provider Diagnosis: 


 Syncope, Syncope and collapse, Arrhythmia








- Physician Notification/Consults


Discussed Patient Care With: Ousmane Henry


Time Discussed With Above Provider: 16:55


Instructed by Provider To: MD Will See In ED





Discharge ED





- Sign-Out/Discharge


Documenting (check all that apply): Patient Departure


All imaging exams completed and their final reports reviewed: No Studies





- Discharge Plan


Condition: Guarded


Disposition: TRANS HIGHER LVL OF CARE FAC


Referrals: 


Bruce Woods MD [Primary Care Provider] - 


Additional Instructions: 


Based on your history and the abnormal EKG I am recommending that you be 

evaluated in the emergency department at this time with transport by EMS.





- Billing Disposition and Condition


Condition: GUARDED


Disposition: Trans Higher Lvl of Care Fac

## 2019-10-07 NOTE — ED
Syncope/Near Syncope





- HPI Summary


HPI Summary: 


The patient is an 80 y/o M presenting to [facility] with a chief complaint of 

syncopal episode at 1600 today. He reports that he had been eating and playing 

bingo when he suddenly felt unwell. He thought that he lied his head down on 

the table, but he had a syncopal episode with loss of consciousness. He denies 

any dizziness, shortness of breath, chest pain, or headache associated with the 

event. He describes the sensation as a feeling of loss of balance. Currently, 

he is in no pain and feels much better. PMHx: CAD, HLD, MI with stent, peptic 

ulcer disease, BPH, right eye blindness. FHx: HTN, cardiac disease. Nonsmoker, 

no EtOH, no substance use. Medications reviewed. Allergies noted.





- History Of Current Complaint


Chief Complaint: EDSyncope


Time Seen by Provider: 10/07/19 17:55


Hx Obtained From: Patient


Onset/Duration: Sudden Onset, Lasting Minutes, Resolved


Timing: Minutes


Context: Witnessed, Loss Of Consciousness


Activity At Onset: At Rest


Associated Head Trauma: No


Aggravating Factor(s): Nothing


Alleviating Factor(s): Spontaneous Resolution


Associated Signs And Symptoms: Other - loss of conciousness, "loss of balance" 

sensation. Negative: dizziness, chest pain, shortness of breath, headache





- Allergies/Home Medications


Allergies/Adverse Reactions: 


 Allergies











Allergy/AdvReac Type Severity Reaction Status Date / Time


 


No Known Allergies Allergy   Verified 10/07/19 16:21











Home Medications: 


 Home Medications





Metoprolol Tartrate TAB* [Lopressor TAB*] 12.5 mg PO BID 10/07/19 [History 

Confirmed 10/07/19]











PMH/Surg Hx/FS Hx/Imm Hx


Endocrine/Hematology History: 


   Denies: Hx Anticoagulant Therapy, Hx Diabetes


Cardiovascular History: Reports: Hx Coronary Artery Disease, Hx 

Hypercholesterolemia, Hx Myocardial Infarction


   Denies: Hx Hypertension, Hx Pacemaker/ICD


   Comment Only: Other Cardiovascular Problems/Disorders - MI


Respiratory History: 


   Denies: Hx Asthma, Hx Chronic Obstructive Pulmonary Disease (COPD), Hx Lung 

Cancer, Hx Pulmonary Embolism


GI History: Reports: Other GI Disorders - peptic ulcer disease


   Denies: Hx Gastroesophageal Reflux Disease


 History: Reports: Hx Benign Prostatic Hyperplasia


   Denies: Hx Renal Disease


   Comment Only: Other  Problems/Disorders - urine retention


Musculoskeletal History: Reports: Hx Back Problems


Sensory History: Reports: Hx Contacts or Glasses, Hx Legally Blind - Rt eye, Hx 

Hearing Problem


   Denies: Hx Cataracts, Hx Eye Injury, Hx Eye Prosthesis, Hx Glaucoma, Hx 

Macular Degeneration, Hx Deafness, Hx Hearing Aid


Opthamlomology History: Reports: Hx Contacts or Glasses, Hx Legally Blind - Rt 

eye


   Denies: Hx Cataracts, Hx Eye Injury, Hx Eye Prosthesis, Hx Glaucoma, Hx 

Macular Degeneration


Neurological History: Reports: Hx Headaches


Psychiatric History: 


   Denies: Hx Panic Disorder





- Surgical History


Surgical History: Yes


Surgery Procedure, Year, and Place: MI WITH STENT.  MULTIPLE HERNIA REPAIRS, 

left side rib repair,.  right femur rodding


Infectious Disease History: No


Infectious Disease History: 


   Denies: Hx Clostridium Difficile, Hx Hepatitis, Hx of Known/Suspected MRSA, 

Hx Shingles, Hx Tuberculosis, Hx Known/Suspected VRE, Traveled Outside the [place] 

in Last 30 Days





- Family History


Known Family History: Positive: Cardiac Disease, Hypertension





- Social History


Alcohol Use: None


Hx Substance Use: No


Substance Use Type: Reports: None


Hx Tobacco Use: No


Smoking Status (MU): Never Smoked Tobacco





Review of Systems


Negative: Chest Pain


Negative: Shortness Of Breath


Negative: Nausea


Neurological: Other - dizziness, "sensation of a loss of balance"


Positive: Syncope - with loss of consciousness.  Negative: Headache


All Other Systems Reviewed And Are Negative: Yes





Physical Exam





- Summary


Physical Exam Summary: 


VITAL SIGNS: Reviewed. 


GENERAL: Patient is a well-developed and nourished male who is lying 

comfortable in the stretcher. Patient is not in any acute respiratory distress. 


HEAD AND FACE: No signs of trauma. No ecchymosis, hematomas or skull 

depressions. No sinus tenderness. 


EYES: PERRLA, EOMI x 2, No injected conjunctiva, no nystagmus. No photophobia.


EARS: Hearing grossly intact. Ear canals and tympanic membranes are within 

normal limits. 


MOUTH: Oropharynx within normal limits. 


NECK: Supple, trachea is midline, no adenopathy, no JVD, no carotid bruit, no c-

spine tenderness, neck with full ROM. No meningeal signs, no Kernig's or 

brudzinskis signs. 


CHEST: Symmetric, no tenderness at palpation. 


LUNGS: Clear to auscultation bilaterally. No wheezing or crackles.


CVS: Regular rate and rhythm, S1 and S2 present, no murmurs or gallops 

appreciated. 


ABDOMEN: Soft, non-tender. No signs of distention. No rebound, no guarding, and 

no masses palpated. Bowel sounds are normal. 


EXTREMITIES: FROM in all major joints, no edema, no cyanosis or clubbing.


NEURO: Alert and oriented x 3. No acute neurological deficits. Speech is normal 

and follows commands. 


SKIN: Dry and warm.


GCS: 15.


Triage Information Reviewed: Yes


Vital Signs On Initial Exam: 


 Initial Vitals











Temp Pulse Resp BP Pulse Ox


 


 98.8 F   108   16   127/89   97 


 


 10/07/19 18:07  10/07/19 18:07  10/07/19 18:07  10/07/19 18:07  10/07/19 18:07











Vital Signs Reviewed: Yes





- Ortiz Coma Scale


Best Eye Response: 4 - Spontaneous


Best Motor Response: 6 - Obeys Commands


Best Verbal Response: 5 - Oriented


Coma Scale Total: 15





Procedures





- Sedation


Patient Received Moderate/Deep Sedation with Procedure: No





Diagnostics





- Vital Signs


 Vital Signs











  Temp Pulse Resp BP Pulse Ox


 


 10/07/19 18:07  98.8 F  108  16  127/89  97














- Laboratory


Result Diagrams: 


 10/08/19 05:44





 10/08/19 05:44


Lab Statement: Any lab studies that have been ordered have been reviewed, and 

results considered in the medical decision making process.





- Radiology


  ** Chest X-Ray


Radiology Interpretation Completed By: Radiologist


Summary of Radiographic Findings: No acute pathology. ED physician has 

interpreted this report. Pending official read.





- CT


  ** Brain CT


CT Interpretation Completed By: Radiologist


Summary of CT Findings: Impression: 1. No acute intracranial abnormality. 2. No 

change from the comparison study. ED physician has reviewed this report.





- EKG


  ** 1846


Cardiac Rate: NL - 65 bpm


EKG Rhythm: Sinus Rhythm


Summary of EKG Findings: EKG at 1846 reveals NSR at 65 bpm. No ST elevations. 

ED physician has reviewed and interpreted this EKG.





Re-Evaluation





- Re-Evaluation


  ** First Eval


Re-Evaluation Time: 20:00


Change: Improved


Comment: He is feeling well. We discussed plan for admission.





Course/Dx


Assessment/Plan: Patient is an 80 y/o M with chief complaint of syncopal 

episode with loss of consciousness but without head injury, dizziness, headache

, chest pain, or shortness of breath occurring at 1600 today. Blood work 

without any significant abnormality except for some slight anemia, AST of 12, 

and troponin of 0.00. Head CT impression: No acute intracranial abnormality. No 

change from the prior. Chest x-ray impression: Cardiomegaly, no acute 

pathology. I discussed my physical exam and test results with Dr. Amin from 

the hospitalist services, and he agrees to admit patient to his services. 

Patient is hemodynamically stable alert and oriented x 3.





- Diagnoses


Provider Diagnoses: 


 Syncope








- Physician Notifications


Discussed Care of Patient With: Fazal Amin - hospitalist


Time Discussed With Above Provider: 19:55


Instructed by Provider To: Admit As Observation - I discussed the patients 

case with Dr. Amin who accepts the patient for admission.





Discharge ED





- Sign-Out/Discharge


Documenting (check all that apply): Patient Departure - Patient is accepted for 

admission by Dr. Amin.





- Discharge Plan


Condition: Improved


Disposition: ADMITTED TO [facility]





- Billing Disposition and Condition


Condition: IMPROVED


Disposition: Admitted to [facility]





- Attestation Statements


Document Initiated by Angie: Yes


Documenting Scribe: Rosio Blanco


Provider For Whom Angie is Documenting (Include Credential): Dr. Jose Suárez MD


Scribe Attestation: 


I, Rosio Blanco, scribed for Dr. Jose Suárez MD on 10/08/19 at 1851. 


Scribe Documentation Reviewed: Yes


Provider Attestation: 


The documentation as recorded by the scribeRosio accurately reflects 

the service I personally performed and the decisions made by me, Dr. Jose Suárez MD


Status of Scribe Document: Viewed

## 2019-10-08 VITALS — SYSTOLIC BLOOD PRESSURE: 149 MMHG | DIASTOLIC BLOOD PRESSURE: 78 MMHG

## 2019-10-08 LAB
ANION GAP SERPL CALC-SCNC: 7 MMOL/L (ref 2–11)
BASOPHILS # BLD AUTO: 0.1 10^3/UL (ref 0–0.2)
BUN SERPL-MCNC: 15 MG/DL (ref 6–24)
BUN/CREAT SERPL: 13.6 (ref 8–20)
CALCIUM SERPL-MCNC: 8.9 MG/DL (ref 8.6–10.3)
CHLORIDE SERPL-SCNC: 107 MMOL/L (ref 101–111)
EOSINOPHIL # BLD AUTO: 0.1 10^3/UL (ref 0–0.6)
FOLATE SERPL-MCNC: 9.37 NG/ML (ref 3.99–?)
GLUCOSE SERPL-MCNC: 106 MG/DL (ref 70–100)
HCO3 SERPL-SCNC: 26 MMOL/L (ref 22–32)
HCT VFR BLD AUTO: 42 % (ref 42–52)
HGB BLD-MCNC: 14 G/DL (ref 14–18)
IRON SERPL-MCNC: 71 UG/DL (ref 50–212)
LYMPHOCYTES # BLD AUTO: 1.7 10^3/UL (ref 1–4.8)
MCH RBC QN AUTO: 31 PG (ref 27–31)
MCHC RBC AUTO-ENTMCNC: 34 G/DL (ref 31–36)
MCV RBC AUTO: 93 FL (ref 80–94)
MONOCYTES # BLD AUTO: 0.5 10^3/UL (ref 0–0.8)
NEUTROPHILS # BLD AUTO: 3.8 10^3/UL (ref 1.5–7.7)
NRBC # BLD AUTO: 0 10^3/UL
NRBC BLD QL AUTO: 0
PLATELET # BLD AUTO: 258 10^3/UL (ref 150–450)
POTASSIUM SERPL-SCNC: 3.9 MMOL/L (ref 3.5–5)
RBC # BLD AUTO: 4.49 10^6 /UL (ref 4.18–5.48)
SODIUM SERPL-SCNC: 140 MMOL/L (ref 135–145)
TIBC SERPL-MCNC: 462 MCG/DL (ref 250–450)
TRANSFERRIN SERPL-MCNC: 330 MG/DL (ref 203–362)
WBC # BLD AUTO: 6.3 10^3/UL (ref 3.5–10.8)
WBC UR QL AUTO: (no result)

## 2019-10-08 RX ADMIN — GABAPENTIN SCH MG: 300 CAPSULE ORAL at 16:41

## 2019-10-08 RX ADMIN — GABAPENTIN SCH MG: 300 CAPSULE ORAL at 08:09
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
## 2019-10-08 NOTE — DS
CC:  Dr. Woods *

 

DISCHARGE SUMMARY:

 

DATE OF ADMISSION:  10/07/19

 

DATE OF DISCHARGE:  10/08/19

 

PRIMARY CARE PROVIDER:  Dr. Woods.

 

DISCHARGE DIAGNOSIS:  Syncope likely due to orthostasis.

 

SECONDARY DIAGNOSES:

1.  History of indwelling Bagley with recent urinary tract infection diagnosed 
and treated with 5 days of antibiotics starting 09/30/19.

2.  History of coronary artery disease.

3.  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 
blind.

4.  History of BPH.

 

MEDICATIONS AT HOME:  Unchanged from admission and include:

1.  Aspirin 81 mg daily.

2.  Proscar 5 mg daily.

3.  Gabapentin 900 mg 3 times a day.

4.  Metoprolol tartrate 12.5 mg b.i.d.

5.  Niaspan  mg daily.

6.  Omeprazole 20 mg daily.

7.  Pilocarpine 1% solution 1 drop right eye every other day.

8.  Flomax 0.4 mg daily.

9.  Timolol 0.25% one drop right eye b.i.d.

10.  Acetaminophen on a p.r.n. basis.

11.  Vitamin B12 of 500 mcg daily.

 

LABORATORY DATA AND STUDIES PERFORMED DURING THE HOSPITAL STAY:  Sodium of 140, 
potassium 3.9, chloride 107, carbon dioxide 26, BUN 15, creatinine 1.1.  Liver 
function tests were unremarkable at admission.  Iron studies showed iron of 71, 
TIBC of 462, percent iron saturation of 15, transferrin of 330.

 

CBC on 10/08/19, white blood cell count of 6.3, hemoglobin of 14.0, hematocrit 
of 42, and platelets of 258.

 

Urinalysis showed cloudy urine, nitrite positive, +3 esterase, +2 wbc's, absent 
bacteria.

 

Transthoracic echocardiogram obtained on 10/07/19, impression:  Ejection 
fraction of 45% to 50% with mild diffuse hypokinesis.  Systolic pressure is 
mildly reduced. There was trace mitral regurgitation and trace aortic 
regurgitation, and compared to study from 09/09/18, there was little change.

 

Carotid Doppler studies obtained on 10/08/19 showed no hemodynamically 
significant stenosis of either carotid artery.

 

Chest x-ray obtained on admission, impression:  "Findings consistent with COPD, 
no evidence of acute disease."

 

Brain CT obtained on admission, impression:  "No acute intracranial 
abnormality. No change from comparison study."

 

HOSPITALIZATION COURSE:  Hernando Rogowicz is an 79-year-old male who has a chronic 
indwelling Bagley.  He had a syncopal episode after lunch.  He did not sustain 
any injuries.  He was placed on overnight observation.  Telemetry monitor bed 
showed no arrhythmias apart from rare PACs.  The patient's orthostatics were 
slightly positive as to that that his blood pressure did not change 
significantly, but his heart rate did go up from 70 to 103 when standing.  Due 
to that, the patient was diagnosed with likely orthostasis and treated with 
intravenous fluids.  By the time of discharge, he was ambulating without any 
problems and no complaints of dizziness.

 

The patient has a history of recent UTI for which he was treated by Dr. Woods 
with 5 days of antibiotics less than a week prior to the patient's admission.  
It appears from my review of medical records that the patient was on Bactrim 
DS.  The patient's cultures grew Proteus mirabilis, Morganella morganii, and 
Enterobacter cloacae complex, all of those bacteria were sensitive to Bactrim.  
At this point, the patient has no urinary symptoms.  He does have an indwelling 
Bagley.  We discussed either possibility of further treatment since the patient'
s urinalysis is somewhat positive versus awaiting the culture results and 
drinking plenty of liquids.  At this point, the patient prefers not to use too 
many antibiotics and we are going to await for final cultures of the patient's 
urine.  The patient's medications at discharge are unchanged.  The patient is 
recommended to drink plenty of liquids and take his time when he is changing 
position from sitting to standing. He is recommended to follow up with Dr. Woods in approximately 4 to 7 days.

 

PHYSICAL EXAMINATION:  At this time of discharge, blood pressure of 152/95, 
heart rate of 71 and regular, respiratory rate 19, oxygen saturation 100% on 
room air, temperature 98.9.  General:  The patient is a very pleasant 79-year-
old male who is in no acute distress.  Alert, awake, and oriented x3.  HEENT:  
Head atraumatic and normocephalic.  Eyes:  The right eye is minimally reactive 
to light.  The left pupil is reactive to light.  Oropharynx clear.  Mucosa 
moist.  Neck:  Supple.  No JVD.  No bruits bilaterally.  Cardiovascular:  
Regular rate and rhythm.  No murmurs.  Respiratory:  Clear to auscultation 
bilaterally.  Abdomen:  Soft and nontender.  Bowel sounds present in all 4 
quadrants.  Extremities:  There is no edema.  Pulses are +2 bilaterally.  No 
clubbing or cyanosis.  On neuro evaluation, speech clear.  Cranial nerves II 
through XII grossly intact.  Motor strength is 5/5 bilaterally.

 

Please note that this is a short summary of the patient's hospital stay.  
Please refer to further medical records for details.

 

DISPOSITION ON DISCHARGE:  Discharged to home.

 

CONDITION ON DISCHARGE:  Stable.

 

 [id number] #: [id number]

[facility]

## 2019-10-08 NOTE — ECHO
*[facility]*

[place] [address]

Phone: [phone]

Fax #: [phone]



-------------------------------------------------------------------

Transthoracic Echocardiogram



Patient: Hernando Hernández                    MRN:        [id number]

:     1940                         Study Date: 10/08/2019

Age:     79                                 Accession#: [id number]

Gender:  M                                  HR:         62 bpm

Height:  70 in /177.8 cm                    BSA:        2.14 m^2

Weight:  199.6 lb /90.7 kg                  BMI:        28.7 kg/m^2



*Sonographer: * Caitlin Esquivel [facility]

 

*Referring Physician: * Fazal Amin

*Reading Physician: * Fernando Moreno MD



-------------------------------------------------------------------

Indications:   Syncope.



-------------------------------------------------------------------

History:   Cerebrovascular accident.  PMH:   Myocardial infarction.

 

-------------------------------------------------------------------

Conclusions



Summary:



- Left ventricle: Systolic function is mildly reduced. The

  estimated ejection fraction is 45-50%. Mild diffuse hypokinesis.

- Right ventricle: Systolic function is normal. Systolic pressure

  is within the normal range.

- Mitral valve: There is trace to mild regurgitation.

- Aortic valve: There is no evidence of stenosis. There is trace

  regurgitation.

- Pericardium, extracardiac: There is no significant pericardial

  effusion.

- Compared to study of 18, there is little change.



-------------------------------------------------------------------

Study data:  Transthoracic echocardiogram.  Procedure:

Transthoracic echocardiography was performed. Image quality was

fair.  Complete 2D, spectral Doppler, and color flow Doppler.

Location:  Bedside.  Patient status:  Inpatient. Patient room

number: [id number].  Rhythm:  Normal sinus rhythm.



-------------------------------------------------------------------

Findings



Left ventricle:  The cavity size is normal. Wall thickness is

mildly increased. Systolic function is mildly reduced. The

estimated ejection fraction is 45-50%.  Mild diffuse hypokinesis.

Doppler parameters are consistent with abnormal left ventricular

relaxation (grade 1 diastolic dysfunction).

Right ventricle:  The cavity size is mildly dilated. Systolic

function is normal. Systolic pressure is within the normal range.

Left atrium:  The atrium is mildly dilated.

Right atrium:  The atrium is mildly dilated.

Mitral valve:  The leaflets are mildly thickened.  There is no

evidence of stenosis.   There is trace to mild regurgitation.

Aortic valve:   The valve is trileaflet. The leaflets are mildly

thickened.  There is no evidence of stenosis.   There is trace

regurgitation.

Tricuspid valve:  The leaflets are normal thickness.  There is no

evidence of stenosis.   There is trace to mild regurgitation.

Pulmonic valve:   The leaflets are normal thickness.  There is no

evidence of stenosis.   There is trace regurgitation.

Aorta:  Aortic root: The aortic root is moderately dilated.

Ascending aorta: The ascending aorta is mildly dilated.

Aortic arch: The aortic arch is appears normal.

Pericardium:  A prominent pericardial fat pad is present. There is

no significant pericardial effusion.

Pulmonary arteries:

The main pulmonary artery is normal-sized. Systolic pressure is

within the normal range.

Systemic veins:

Inferior vena cava: The vessel is normal in size. There is (&gt;= 50%)

respiratory change in the IVC dimension.



-------------------------------------------------------------------

Measurements



 Left ventricle              Value        Ref       Aortic valve continued      Value        Ref

 EDGARDO, LAX                    4.8   cm     4.2 -     Mean grad, S                3.1   mm Hg  -----

                                          5.8       Peak grad, S                5.1   mm Hg  -----

 ESD, LAX                    4.0   cm     2.5 -     LVOT/AV, VTI ratio          0.59         -----

                                          4.0       EDWARD, VTI                    1.94  cm^2   -----

 FS, LAX             (L)     16    %      25 - 43   EDWARD, Vmax                   1.95  cm^2   -----

 PW, ED, LAX         (H)     1.2   cm     0.6 -

                                          1.0       Mitral valve                Value        Ref

 FS                  (L)     16    %      25 - 43   Peak E                      0.42  m/sec  -----

 PW, ED              (H)     1.2   cm     0.6 -     Peak A                      0.56  m/sec  -----

                                          1.0       Decel time                  437   ms     -----

                                                    Peak E/A ratio              0.76         -----

 LVOT                        Value        Ref

 Diam, S                     2.06  cm     --------  Pulmonic valve              Value        Ref

 Area                        3.3   cm^2   --------  Peak v, S                   0.49  m/sec  -----

 Peak ronel, S                 0.64  m/sec  --------  Peak grad, S                1.0   mm Hg  -----

 VTI, S                      12.6  cm     --------

 Peak grad, S                2     mm Hg  --------  Tricuspid valve             Value        Ref

 Mean grad, S                1     mm Hg  --------  TR peak v                   2.41  m/sec  &lt;=2.8


                                                    Peak RV-RA grad, S          23    mm Hg  -----

 Ventricular septum          Value        Ref

 IVS, ED             (H)     1.2   cm     0.6 -     Aortic root                 Value        Ref

                                          1.0       Root diam          (H)      4.4   cm     &lt;4.2

 

 Right ventricle             Value        Ref       Ascending aorta             Value        Ref

 EDGARDO, LAX                    3.8   cm     --------  AAo AP diam, S              4.1   cm     -----

 EDGARDO minor ax, A4C   (H)     4.6   cm     1.9 -     AAo AP diam/bsa, S          1.9   cm/m^2 -----

 mid                                      3.5       AAo peak v                  0.54  m/sec  -----

 Pressure, S                 26    mm Hg  --------

                                                    Aortic arch                 Value        Ref

 Left atrium                 Value        Ref       Arch diam                   2.7   cm     -----

 ML dim, A4C                 3.9   cm     --------

 SI dim, A4C                 6.3   cm     --------  Decending aorta             Value        Ref

 Vol, ES, 2-p                73    ml     --------  Carloz peak ronel                0.54  m/sec  -----

 Vol/bsa, ES, 2-p            34    ml/m^2 16 - 34

                                                    Pulmonary artery            Value        Ref

 Right atrium                Value        Ref       Pressure, S                 25.0  mm Hg  -----

 SI dim, ES                  5.0   cm     3.4 -

                                          5.3       Inferior vena cava          Value        Ref

 ML dim, ES, A4C     (H)     4.8   cm     2.6 -     Diam                        1.7   cm     -----

                                          4.4

 SI dim, ES, A4C             5.0   cm     3.4 -

                                          5.3

 Estimated RAP               3     mm Hg  --------

 

 Aortic valve                Value        Ref

 Lauren diam, S                 2.1   cm     2.0 -

                                          3.2

 Lauren diam/bsa, S     (L)     1.0   cm/m^2 1.1 -

                                          1.5

 Peak v, S                   1.13  m/sec  --------

 VTI, S                      21.5  cm     --------

 

Legend:

(L)  and  (H)  orville values outside specified reference range.



Prepared and electronically signed by



Fernando Moreno MD

10/08/2019 16:13

## 2019-11-11 ENCOUNTER — HOSPITAL ENCOUNTER (EMERGENCY)
Dept: HOSPITAL 25 - ED | Age: 79
Discharge: HOME | End: 2019-11-11
Payer: MEDICARE

## 2019-11-11 DIAGNOSIS — I25.10: ICD-10-CM

## 2019-11-11 DIAGNOSIS — N40.0: ICD-10-CM

## 2019-11-11 DIAGNOSIS — Z95.5: ICD-10-CM

## 2019-11-11 DIAGNOSIS — Z79.899: ICD-10-CM

## 2019-11-11 DIAGNOSIS — I25.2: ICD-10-CM

## 2019-11-11 DIAGNOSIS — R11.0: Primary | ICD-10-CM

## 2019-11-11 DIAGNOSIS — I70.0: ICD-10-CM

## 2019-11-11 DIAGNOSIS — E78.00: ICD-10-CM

## 2019-11-11 DIAGNOSIS — K57.90: ICD-10-CM

## 2019-11-11 DIAGNOSIS — Z90.49: ICD-10-CM

## 2019-11-11 DIAGNOSIS — K43.9: ICD-10-CM

## 2019-11-11 LAB
ALBUMIN SERPL BCG-MCNC: 4.6 G/DL (ref 3.2–5.2)
ALBUMIN/GLOB SERPL: 1.3 {RATIO} (ref 1–3)
ALP SERPL-CCNC: 98 U/L (ref 34–104)
ALT SERPL W P-5'-P-CCNC: 11 U/L (ref 7–52)
ANION GAP SERPL CALC-SCNC: 12 MMOL/L (ref 2–11)
AST SERPL-CCNC: 14 U/L (ref 13–39)
BASOPHILS # BLD AUTO: 0 10^3/UL (ref 0–0.2)
BUN SERPL-MCNC: 23 MG/DL (ref 6–24)
BUN/CREAT SERPL: 21.9 (ref 8–20)
CALCIUM SERPL-MCNC: 10.3 MG/DL (ref 8.6–10.3)
CHLORIDE SERPL-SCNC: 100 MMOL/L (ref 101–111)
EOSINOPHIL # BLD AUTO: 0 10^3/UL (ref 0–0.6)
GLOBULIN SER CALC-MCNC: 3.5 G/DL (ref 2–4)
GLUCOSE SERPL-MCNC: 115 MG/DL (ref 70–100)
HCO3 SERPL-SCNC: 24 MMOL/L (ref 22–32)
HCT VFR BLD AUTO: 48 % (ref 42–52)
HGB BLD-MCNC: 16.5 G/DL (ref 14–18)
LYMPHOCYTES # BLD AUTO: 1.5 10^3/UL (ref 1–4.8)
MCH RBC QN AUTO: 32 PG (ref 27–31)
MCHC RBC AUTO-ENTMCNC: 35 G/DL (ref 31–36)
MCV RBC AUTO: 92 FL (ref 80–94)
MONOCYTES # BLD AUTO: 0.9 10^3/UL (ref 0–0.8)
NEUTROPHILS # BLD AUTO: 8.2 10^3/UL (ref 1.5–7.7)
NRBC # BLD AUTO: 0 10^3/UL
NRBC BLD QL AUTO: 0.1
PLATELET # BLD AUTO: 289 10^3/UL (ref 150–450)
POTASSIUM SERPL-SCNC: 3.7 MMOL/L (ref 3.5–5)
PROT SERPL-MCNC: 8.1 G/DL (ref 6.4–8.9)
RBC # BLD AUTO: 5.21 10^6 /UL (ref 4.18–5.48)
SODIUM SERPL-SCNC: 136 MMOL/L (ref 135–145)
TROPONIN I SERPL-MCNC: 0.01 NG/ML (ref ?–0.04)
WBC # BLD AUTO: 10.6 10^3/UL (ref 3.5–10.8)

## 2019-11-11 PROCEDURE — 96361 HYDRATE IV INFUSION ADD-ON: CPT

## 2019-11-11 PROCEDURE — 83605 ASSAY OF LACTIC ACID: CPT

## 2019-11-11 PROCEDURE — 36415 COLL VENOUS BLD VENIPUNCTURE: CPT

## 2019-11-11 PROCEDURE — 74177 CT ABD & PELVIS W/CONTRAST: CPT

## 2019-11-11 PROCEDURE — 71045 X-RAY EXAM CHEST 1 VIEW: CPT

## 2019-11-11 PROCEDURE — 99282 EMERGENCY DEPT VISIT SF MDM: CPT

## 2019-11-11 PROCEDURE — 85025 COMPLETE CBC W/AUTO DIFF WBC: CPT

## 2019-11-11 PROCEDURE — 93005 ELECTROCARDIOGRAM TRACING: CPT

## 2019-11-11 PROCEDURE — 96374 THER/PROPH/DIAG INJ IV PUSH: CPT

## 2019-11-11 PROCEDURE — 84484 ASSAY OF TROPONIN QUANT: CPT

## 2019-11-11 PROCEDURE — 96375 TX/PRO/DX INJ NEW DRUG ADDON: CPT

## 2019-11-11 PROCEDURE — 80053 COMPREHEN METABOLIC PANEL: CPT

## 2019-11-11 PROCEDURE — 86140 C-REACTIVE PROTEIN: CPT

## 2019-11-11 NOTE — ED
Respiratory





- HPI Summary


HPI Summary: 





Pt is an 80 y/o M presenting to the ED with a chief complaint of respiratory 

illness. Pt states for the past 4-5 days, his mouth has been dry and he has 

been suffering from congestion, decreased appetite, and intermittent nausea. He 

denies any pain or shortness of breath. He feels somewhat better today but 

thought he should be evaluated anyway.








- History of Current Complaint


Chief Complaint: EDUpperRespComplaint


Stated Complaint: DONT FEEL GOOD PER PT


Time Seen by Provider: 11/11/19 13:14


Hx Obtained From: Patient


Onset/Duration: Gradual Onset, Lasting Days, Still Present


Timing: Constant


Initial Severity: Moderate


Current Severity: Moderate


Pain Intensity: 4


Sputum Amount: None


Aggravating Factor(s): Nothing


Alleviating Factor(s): Nothing


Associated Signs and Symptoms: Nasal Congestion





- Allergy/Home Medications


Allergies/Adverse Reactions: 


 Allergies











Allergy/AdvReac Type Severity Reaction Status Date / Time


 


No Known Allergies Allergy   Verified 11/11/19 12:45











Home Medications: 


 Home Medications





Acetaminophen [Tylenol Extra Strength] 500 mg PO Q6HR PRN 11/11/19 [History 

Confirmed 11/11/19]


Cyanocobalamin TAB* [Vitamin B12 TAB*] 1,000 mcg PO BID 11/11/19 [History 

Confirmed 11/11/19]


Metoprolol Succinate XL TAB* [Toprol XL TAB*] 25 mg PO DAILY 11/11/19 [History 

Confirmed 11/11/19]


Travoprost 0.004% (NF) [Travatan Z (NF)] 1 drop RIGHT EYE QPM 11/11/19 [History 

Confirmed 11/11/19]











PMH/Surg Hx/FS Hx/Imm Hx


Previously Healthy: Yes


Endocrine/Hematology History: 


   Denies: Hx Anticoagulant Therapy, Hx Diabetes, Hx Thyroid Disease, Hx Anemia


Cardiovascular History: Reports: Hx Coronary Artery Disease, Hx 

Hypercholesterolemia, Hx Myocardial Infarction


   Denies: Hx Angina, Hx Hypertension, Hx Pacemaker/ICD, Hx Peripheral Vascular 

Disease, Hx Syncope


   Comment Only: Other Cardiovascular Problems/Disorders - MI


Respiratory History: 


   Denies: Hx Asthma, Hx Chronic Obstructive Pulmonary Disease (COPD), Hx Lung 

Cancer, Hx Pulmonary Embolism


GI History: Reports: Other GI Disorders - peptic ulcer disease


   Denies: Hx Gastroesophageal Reflux Disease


 History: Reports: Hx Benign Prostatic Hyperplasia


   Denies: Hx Renal Disease


   Comment Only: Other  Problems/Disorders - urine retention


Musculoskeletal History: Reports: Hx Back Problems


Sensory History: Reports: Hx Contacts or Glasses, Hx Legally Blind - Rt eye, Hx 

Vision Problem - R eye blindness, Hx Hearing Problem


   Denies: Hx Cataracts, Hx Eye Injury, Hx Eye Prosthesis, Hx Glaucoma, Hx 

Macular Degeneration, Hx Deafness, Hx Hearing Aid, Other Sensory Impairments


Opthamlomology History: Reports: Hx Contacts or Glasses, Hx Legally Blind - Rt 

eye, Hx Vision Problem - R eye blindness


   Denies: Hx Cataracts, Hx Eye Injury, Hx Eye Prosthesis, Hx Glaucoma, Hx 

Macular Degeneration, Other Sensory Impairments


Neurological History: Reports: Hx Headaches


   Denies: Hx Dementia, Hx Developmental Delay, Hx Migraine, Hx Nerve Disease, 

Hx Seizures, Hx Spinal Cord Injury, Hx Transient Ischemic Attacks (TIA), Other 

Neuro Impairments/Disorders


Psychiatric History: 


   Denies: Hx Panic Disorder





- Surgical History


Surgery Procedure, Year, and Place: MI WITH STENT.  MULTIPLE HERNIA REPAIRS, 

left side rib repair,.  right femur rodding


Hx Anesthesia Reactions: No


Infectious Disease History: No


Infectious Disease History: 


   Denies: Hx Clostridium Difficile, Hx Hepatitis, Hx Human Immunodeficiency 

Virus (HIV), Hx of Known/Suspected MRSA, Hx Shingles, Hx Tuberculosis, Hx Known/

Suspected VRE, Traveled Outside the [place] in Last 30 Days





- Family History


Known Family History: Positive: Cardiac Disease, Hypertension





- Social History


Alcohol Use: None


Hx Substance Use: No


Substance Use Type: Reports: None


Hx Tobacco Use: No


Smoking Status (MU): Never Smoked Tobacco





Review of Systems


Positive: Other - decreased appetite


Positive: Nasal Discharge, Other - dry mouth


Negative: Shortness Of Breath


Negative: Myalgia


All Other Systems Reviewed And Are Negative: Yes





Physical Exam





- Summary


Physical Exam Summary: 





Appearance: The patient is well-nourished in no acute distress and in no acute 

pain.


 


Skin: The skin is warm and dry, and skin color reflects adequate perfusion.





HEENT: The head is normocephalic and atraumatic. The pupils are equal and 

reactive. The conjunctivae are clear and without drainage. Nares are patent and 

without drainage. Mouth reveals dry mucous membranes, and the throat is without 

erythema and exudate. The external ears are intact. The ear canals are patent 

and without drainage. The tympanic membranes are intact.


 


Neck: The neck is supple with full range of motion and non-tender. There are no 

carotid bruits. There is no neck vein distension.


 


Respiratory: Chest is non-tender. Lungs are clear to auscultation and breath 

sounds are symmetrical and equal.


 


Cardiovascular: Heart is regular rate and rhythm. There is no murmur or rub 

auscultated. There is no peripheral edema and pulses are symmetrical and equal.


 


Abdomen: The abdomen is soft and non-tender. There are normal bowel sounds 

heard in all four quadrants and there is no organomegaly palpated.


 


Musculoskeletal: There is no back tenderness noted. Extremities are non-tender 

with full range of motion. There is good capillary refill. There is no 

peripheral edema or calf tenderness elicited.


 


Neurological: Patient is alert and oriented to person, place and time. The 

patient has symmetrical motor strength in all four extremities. Cranial nerves 

are grossly intact. Deep tendon reflexes are symmetrical and equal in all four 

extremities.


 


Psychiatric: The patient has an appropriate affect and does not exhibit any 

anxiety or depression.





Triage Information Reviewed: Yes


Vital Signs On Initial Exam: 


 Initial Vitals











Temp Pulse Resp BP Pulse Ox


 


 97.2 F   82   16   161/112   99 


 


 11/11/19 12:40  11/11/19 12:40  11/11/19 12:40  11/11/19 12:40  11/11/19 12:40











Vital Signs Reviewed: Yes





Procedures





- Sedation


Patient Received Moderate/Deep Sedation with Procedure: No





Diagnostics





- Vital Signs


 Vital Signs











  Temp Pulse Resp BP Pulse Ox


 


 11/11/19 12:40  97.2 F  82  16  161/112  99














- Laboratory


Result Diagrams: 


 11/11/19 13:40





 11/11/19 13:40


Lab Statement: Any lab studies that have been ordered have been reviewed, and 

results considered in the medical decision making process.





- Radiology


  ** CXR


Radiology Interpretation Completed By: Radiologist


Summary of Radiographic Findings: No evidence for active cardiopulmonary 

disease.  ED physician has reviewed this report.





- CT


  ** CT a/p


CT Interpretation Completed By: Radiologist


Summary of CT Findings: 1.  MILD INTRAHEPATIC BILIARY DILATATION.  2.  STATUS 

POST CHOLECYSTECTOMY.  3.  FATTY ATROPHY OF THE PANCREAS.  4.  DIVERTICULOSIS.  

5.  ATHEROSCLEROSIS.  6.  VENTRAL HERNIA.  7.  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 

DILATATION OF THE PROXIMAL CELIAC ARTERY.  ED physician has reviewed this 

report.





- EKG


  ** 1352


Cardiac Rate: NL - 75bpm


EKG Rhythm: Sinus Rhythm


ST Segment: Normal


Ectopy: None


Summary of EKG Findings: EKG at 1352 shows normal sinus rhythm at 75bpm, normal 

ST, no ectopy, no STEMI. Nonspecific T-waves in the anterior leads. ED 

physician has reviewed and interpreted this report.





  ** 1644


Cardiac Rate: NL - 96bpm


EKG Rhythm: Sinus Rhythm


ST Segment: Normal


Ectopy: None


Summary of EKG Findings: EKG at 1644 shows normal sinus rhythm at 96bpm, normal 

ST, no ectopy, no STEMI. Nonspecific T-waves in the anterior leads. No change 

from 1352. ED physician has reviewed and interpreted this report.





Re-Evaluation





- Re-Evaluation


  ** First Eval


Re-Evaluation Time: 20:30


Change: Improved


Comment: Patient reports feeling mildly better with treatment





  ** Second Eval


Re-Evaluation Time: 21:58


Comment: Discussed results with patient. Pt will be discharged home with dx of 

nausea. Pt understands and agrees.





Disposition





- Course


Course Of Treatment: Mr. Hernández had vague complaints when he arrived.  He was 

nontoxic in appearance with stable vitals aside from a very mildly elevated 

blood pressure.  He has been intermittently nauseated and not taking his 

medications well.  He got some fluids here while labs, EKG and x-ray were 

obtained and were not very remarkable.  At one point he complained of nausea 

and got Compazine which seemed to help.  I'm quite concerned as his 

presentation was very vague and I could not find anything and I recommended 

that he follow-up within the next day or 2 with his PCP Dr. Carrion.





- Diagnoses


Provider Diagnoses: 


 Nausea








Discharge ED





- Sign-Out/Discharge


Documenting (check all that apply): Patient Departure - Discharge





- Discharge Plan


Condition: Stable


Disposition: HOME


Prescriptions: 


Prochlorperazine TAB* [Compazine Tab*] 10 mg PO Q6H PRN #10 tab


 PRN Reason: Nausea


Patient Education Materials:  Acute Nausea and Vomiting (ED)


Referrals: 


Bruce Woods MD [Primary Care Provider] - 1 Day


Additional Instructions: 


Follow up with your primary care provider tomorrow or Tuesday. RETURN TO THE ER 

FOR WORSENING OR CHANGING SYMPTOMS.





- Billing Disposition and Condition


Condition: STABLE


Disposition: Home





- Attestation Statements


Document Initiated by Scribe: Yes


Documenting Scribe: Sonya Singh and Bruce Vieira


Provider For Whom Scribe is Documenting (Include Credential): Tin Martell MD


Scribe Attestation: 


I, Sonya Singh and Bruce Vieira, scribed for Tin Martell MD on 11/11/19 

at 2202. 


Scribe Documentation Reviewed: Yes


Provider Attestation: 


The documentation as recorded by the scribe, Sonya Singh and Bruce Vieira 

accurately reflects the service I personally performed and the decisions made 

by me, Tin Martell MD


Status of Scribe Document: Viewed
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
## 2019-12-04 ENCOUNTER — HOSPITAL ENCOUNTER (EMERGENCY)
Dept: HOSPITAL 25 - ED | Age: 79
LOS: 1 days | Discharge: HOME | End: 2019-12-05
Payer: MEDICARE

## 2019-12-04 DIAGNOSIS — N40.0: ICD-10-CM

## 2019-12-04 DIAGNOSIS — Z46.6: Primary | ICD-10-CM

## 2019-12-04 DIAGNOSIS — E78.00: ICD-10-CM

## 2019-12-04 DIAGNOSIS — I25.10: ICD-10-CM

## 2019-12-04 DIAGNOSIS — R51: ICD-10-CM

## 2019-12-04 DIAGNOSIS — I25.2: ICD-10-CM

## 2019-12-04 DIAGNOSIS — Z79.82: ICD-10-CM

## 2019-12-04 PROCEDURE — 87186 SC STD MICRODIL/AGAR DIL: CPT

## 2019-12-04 PROCEDURE — 81003 URINALYSIS AUTO W/O SCOPE: CPT

## 2019-12-04 PROCEDURE — 99282 EMERGENCY DEPT VISIT SF MDM: CPT

## 2019-12-04 PROCEDURE — 87077 CULTURE AEROBIC IDENTIFY: CPT

## 2019-12-04 PROCEDURE — 81015 MICROSCOPIC EXAM OF URINE: CPT

## 2019-12-04 PROCEDURE — 87086 URINE CULTURE/COLONY COUNT: CPT

## 2019-12-05 LAB
RBC UR QL AUTO: (no result)
WBC UR QL AUTO: (no result)

## 2019-12-05 NOTE — ED
GI/ HPI





- HPI Summary


HPI Summary: 





Patient is an 78 y/o M presenting to the ED for a chief complaint of decreased 

urine output from his catheter that began on 12/04/19. Patient reports that he 

has 2/10 groin pain and is concerned he has a problem with his catheter. He has 

a history of calcium deposits in his catheter. He denies fever. Patient denies 

any aggravating or alleviating factors. Patient denies alcohol or tobacco use. 





- History of Current Complaint


Chief Complaint: EDUrogenitalProblems


Time Seen by Provider: 12/05/19 01:03


Stated Complaint: CATHETER PROBLEMS PER EMS


Hx Obtained From: Patient


Onset/Duration: Started Hours Ago, Atraumatic, Still Present


Timing: Constant


Severity: Mild


Current Severity: Mild


Pain Intensity: 2


Location of Pain: Groin


Associated Signs and Symptoms: Positive: Other: - Positive decreased urine 

output.  Negative: Fever





- Additional Pertinent History


Primary Care Physician: FELIX





- Allergy/Home Medications


Allergies/Adverse Reactions: 


 Allergies











Allergy/AdvReac Type Severity Reaction Status Date / Time


 


No Known Allergies Allergy   Verified 11/11/19 12:45














PMH/Surg Hx/FS Hx/Imm Hx


Previously Healthy: Yes


Endocrine/Hematology History: 


   Denies: Hx Anticoagulant Therapy, Hx Diabetes, Hx Thyroid Disease, Hx Anemia


Cardiovascular History: Reports: Hx Coronary Artery Disease, Hx 

Hypercholesterolemia, Hx Myocardial Infarction


   Denies: Hx Angina, Hx Hypertension, Hx Pacemaker/ICD, Hx Peripheral Vascular 

Disease, Hx Syncope


   Comment Only: Other Cardiovascular Problems/Disorders - MI


Respiratory History: 


   Denies: Hx Asthma, Hx Chronic Obstructive Pulmonary Disease (COPD), Hx Lung 

Cancer, Hx Pulmonary Embolism


GI History: Reports: Other GI Disorders - peptic ulcer disease


   Denies: Hx Gastroesophageal Reflux Disease


 History: Reports: Hx Benign Prostatic Hyperplasia


   Denies: Hx Renal Disease


   Comment Only: Other  Problems/Disorders - urine retention


Musculoskeletal History: Reports: Hx Back Problems


Sensory History: Reports: Hx Contacts or Glasses, Hx Legally Blind - Rt eye, Hx 

Vision Problem - R eye blindness, Hx Hearing Problem


   Denies: Hx Cataracts, Hx Eye Injury, Hx Eye Prosthesis, Hx Glaucoma, Hx 

Macular Degeneration, Hx Deafness, Hx Hearing Aid, Other Sensory Impairments


Opthamlomology History: Reports: Hx Contacts or Glasses, Hx Legally Blind - Rt 

eye, Hx Vision Problem - R eye blindness


   Denies: Hx Cataracts, Hx Eye Injury, Hx Eye Prosthesis, Hx Glaucoma, Hx 

Macular Degeneration, Other Sensory Impairments


EENT History: 


   Denies: Hx Deafness


Neurological History: Reports: Hx Headaches


   Denies: Hx Dementia, Hx Developmental Delay, Hx Migraine, Hx Nerve Disease, 

Hx Seizures, Hx Spinal Cord Injury, Hx Transient Ischemic Attacks (TIA), Other 

Neuro Impairments/Disorders


Psychiatric History: 


   Denies: Hx Panic Disorder





- Surgical History


Surgical History: Yes


Surgery Procedure, Year, and Place: MI WITH STENT.  MULTIPLE HERNIA REPAIRS, 

left side rib repair,.  right femur rodding


Hx Anesthesia Reactions: No


Infectious Disease History: No


Infectious Disease History: 


   Denies: Hx Clostridium Difficile, Hx Hepatitis, Hx Human Immunodeficiency 

Virus (HIV), Hx of Known/Suspected MRSA, Hx Shingles, Hx Tuberculosis, Hx Known/

Suspected VRE, Traveled Outside the [place] in Last 30 Days





- Family History


Known Family History: Positive: Cardiac Disease, Hypertension





- Social History


Occupation: Retired


Alcohol Use: None


Hx Substance Use: No


Substance Use Type: Reports: None


Hx Tobacco Use: No


Smoking Status (MU): Never Smoked Tobacco





Review of Systems


Negative: Fever


Positive: pain - Groin, other - Positive decreased urine output


All Other Systems Reviewed And Are Negative: Yes





Physical Exam





- Summary


Physical Exam Summary: 





Appearance: Well-appearing, Well-nourished, lying in bed comfortable


Skin: Warm, dry, no obvious rash


Eyes: sclera anicteric, no conjunctival pallor


ENT: mucous membranes moist


Neck: deferred


Respiratory: No signs of respiratory distress


Cardiovascular: Appears well perfused, pulses are nml


Abdomen: deferred


Musculoskeletal: Moving all 4 extremities without obvious discomfort


Neurological: Awake and alert, mentation is normal, speech is fluent and 

appropriate


Psychiatric: affect is normal, does not appear anxious or depressed


Triage Information Reviewed: Yes


Vital Signs On Initial Exam: 


 Initial Vitals











Temp Pulse Resp BP Pulse Ox


 


 98.3 F   65   20   150/94   98 


 


 12/04/19 21:40  12/04/19 21:40  12/04/19 21:40  12/04/19 21:40  12/04/19 21:40











Vital Signs Reviewed: Yes





Procedures





- Sedation


Patient Received Moderate/Deep Sedation with Procedure: No





Diagnostics





- Vital Signs


 Vital Signs











  Temp Pulse Resp BP Pulse Ox


 


 12/04/19 21:40  98.3 F  65  20  150/94  98














- Laboratory


Lab Statement: Any lab studies that have been ordered have been reviewed, and 

results considered in the medical decision making process.





GIGU Course/Dx





- Course


Course Of Treatment: Patient is an 78 y/o M presenting to the ED for a chief 

complaint of decreased urine output from his catheter that began on 12/04/19. 

Patient reports that he has 2/10 groin pain and is concerned he has a problem 

with his catheter. He has a history of calcium deposits in his catheter. He 

denies fever. Patient denies any aggravating or alleviating factors. Patient 

denies alcohol or tobacco use.  On exam, unremarkable findings.  Laboratory 

abnormal findings: urine protein 1+, urine ketones trace, urine blood 2+, urine 

nitrate positive, urine leukocyte esterase 3+, urine WBC 3+, urine RBC 3+, 

urine yeast present.  Patient will be discharged with a diagnosis of rocha 

catheter placement. Follow up with PCP as needed.





- Diagnoses


Provider Diagnoses: 


 Encounter for Rocha catheter replacement








Discharge ED





- Sign-Out/Discharge


Documenting (check all that apply): Patient Departure - Discharge





- Discharge Plan


Condition: Good


Disposition: HOME


Patient Education Materials:  Rocha Catheter Placement and Care (ED)


Referrals: 


Bruce Woods MD [Primary Care Provider] - If Needed


Additional Instructions: 


Your catheter seems to have cleared. We sent a urine sample to the lab for 

culture and will contact you if you need to be on antibiotics.





- Billing Disposition and Condition


Condition: GOOD


Disposition: Home





- Attestation Statements


Document Initiated by Angie: Yes


Documenting Gwenibe: Kayley Chanel


Provider For Whom Angie is Documenting (Include Credential): Tin Braden MD


Scribe Attestation: 


Kayley NUNO scribed for Tin Braden MD on 12/05/19 at 0633. 


Scribe Documentation Reviewed: Yes


Provider Attestation: 


The documentation as recorded by the Kayley pa accurately reflects 

the service I personally performed and the decisions made by me, Tin Braden MD


Status of [place] Document: Viewed

## 2019-12-09 NOTE — ED
Imaging and Labs Follow Up


Follow Up Type: Labs/Cultures


Labs/Culture Result: 


Patient here with urinary catheter problem.  He has persistent UTIs.


No fever, however no squamous cells were present.  3+ leuks 3+ that WBCs.





Patient Communication/Plan: 


Patient was called at 5:15 PM on 12/9/19 to make aware of results


Prescription sent to pharmacy at this time, Keflex 500 mg twice a day 5 days.


Patient Communication/Plan: 


Patient will fill the prescription today he states.  He will follow up with 

urology.  I have discussed with the patient, this is likely an ongoing issue 

that will need to be managed carefully with a urologist.





Provider Diagnoses: 


 Encounter for Bagley catheter replacement

## 2019-12-26 ENCOUNTER — HOSPITAL ENCOUNTER (EMERGENCY)
Dept: HOSPITAL 25 - ED | Age: 79
Discharge: HOME | End: 2019-12-26
Payer: MEDICARE

## 2019-12-26 VITALS — DIASTOLIC BLOOD PRESSURE: 103 MMHG | SYSTOLIC BLOOD PRESSURE: 156 MMHG

## 2019-12-26 DIAGNOSIS — R20.2: ICD-10-CM

## 2019-12-26 DIAGNOSIS — N40.0: ICD-10-CM

## 2019-12-26 DIAGNOSIS — F03.90: ICD-10-CM

## 2019-12-26 DIAGNOSIS — R11.0: Primary | ICD-10-CM

## 2019-12-26 DIAGNOSIS — E78.00: ICD-10-CM

## 2019-12-26 DIAGNOSIS — I25.10: ICD-10-CM

## 2019-12-26 DIAGNOSIS — Z95.5: ICD-10-CM

## 2019-12-26 DIAGNOSIS — I25.2: ICD-10-CM

## 2019-12-26 LAB
ALBUMIN SERPL BCG-MCNC: 4.3 G/DL (ref 3.2–5.2)
ALBUMIN/GLOB SERPL: 1.4 {RATIO} (ref 1–3)
ALP SERPL-CCNC: 92 U/L (ref 34–104)
ALT SERPL W P-5'-P-CCNC: 12 U/L (ref 7–52)
ANION GAP SERPL CALC-SCNC: 10 MMOL/L (ref 2–11)
AST SERPL-CCNC: 13 U/L (ref 13–39)
BASOPHILS # BLD AUTO: 0 10^3/UL (ref 0–0.2)
BUN SERPL-MCNC: 13 MG/DL (ref 6–24)
BUN/CREAT SERPL: 13.4 (ref 8–20)
CALCIUM SERPL-MCNC: 10.1 MG/DL (ref 8.6–10.3)
CHLORIDE SERPL-SCNC: 102 MMOL/L (ref 101–111)
EOSINOPHIL # BLD AUTO: 0 10^3/UL (ref 0–0.6)
GLOBULIN SER CALC-MCNC: 3.1 G/DL (ref 2–4)
GLUCOSE SERPL-MCNC: 99 MG/DL (ref 70–100)
HCO3 SERPL-SCNC: 24 MMOL/L (ref 22–32)
HCT VFR BLD AUTO: 44 % (ref 42–52)
HGB BLD-MCNC: 15.4 G/DL (ref 14–18)
LYMPHOCYTES # BLD AUTO: 1.6 10^3/UL (ref 1–4.8)
MCH RBC QN AUTO: 32 PG (ref 27–31)
MCHC RBC AUTO-ENTMCNC: 35 G/DL (ref 31–36)
MCV RBC AUTO: 92 FL (ref 80–94)
MONOCYTES # BLD AUTO: 0.6 10^3/UL (ref 0–0.8)
NEUTROPHILS # BLD AUTO: 5 10^3/UL (ref 1.5–7.7)
NRBC # BLD AUTO: 0 10^3/UL
NRBC BLD QL AUTO: 0
PLATELET # BLD AUTO: 257 10^3/UL (ref 150–450)
POTASSIUM SERPL-SCNC: 4.7 MMOL/L (ref 3.5–5)
PROT SERPL-MCNC: 7.4 G/DL (ref 6.4–8.9)
RBC # BLD AUTO: 4.83 10^6 /UL (ref 4.18–5.48)
SODIUM SERPL-SCNC: 136 MMOL/L (ref 135–145)
WBC # BLD AUTO: 7.3 10^3/UL (ref 3.5–10.8)

## 2019-12-26 PROCEDURE — 80053 COMPREHEN METABOLIC PANEL: CPT

## 2019-12-26 PROCEDURE — 85025 COMPLETE CBC W/AUTO DIFF WBC: CPT

## 2019-12-26 PROCEDURE — 36415 COLL VENOUS BLD VENIPUNCTURE: CPT

## 2019-12-26 PROCEDURE — 99285 EMERGENCY DEPT VISIT HI MDM: CPT

## 2019-12-26 PROCEDURE — 70450 CT HEAD/BRAIN W/O DYE: CPT

## 2020-02-12 ENCOUNTER — HOSPITAL ENCOUNTER (EMERGENCY)
Dept: HOSPITAL 25 - ED | Age: 80
Discharge: HOME | End: 2020-02-12
Payer: MEDICARE

## 2020-02-12 VITALS — DIASTOLIC BLOOD PRESSURE: 81 MMHG | SYSTOLIC BLOOD PRESSURE: 134 MMHG

## 2020-02-12 DIAGNOSIS — I25.2: ICD-10-CM

## 2020-02-12 DIAGNOSIS — T83.098A: Primary | ICD-10-CM

## 2020-02-12 DIAGNOSIS — B96.4: ICD-10-CM

## 2020-02-12 DIAGNOSIS — Z95.5: ICD-10-CM

## 2020-02-12 LAB
ANION GAP SERPL CALC-SCNC: 12 MMOL/L (ref 2–11)
BASOPHILS # BLD AUTO: 0 10^3/UL (ref 0–0.2)
BUN SERPL-MCNC: 19 MG/DL (ref 6–24)
BUN/CREAT SERPL: 16.4 (ref 8–20)
CALCIUM SERPL-MCNC: 9.6 MG/DL (ref 8.6–10.3)
CHLORIDE SERPL-SCNC: 103 MMOL/L (ref 101–111)
EOSINOPHIL # BLD AUTO: 0.1 10^3/UL (ref 0–0.6)
GLUCOSE SERPL-MCNC: 122 MG/DL (ref 70–100)
HCO3 SERPL-SCNC: 25 MMOL/L (ref 22–32)
HCT VFR BLD AUTO: 46 % (ref 42–52)
HGB BLD-MCNC: 15.9 G/DL (ref 14–18)
LYMPHOCYTES # BLD AUTO: 1 10^3/UL (ref 1–4.8)
MCH RBC QN AUTO: 33 PG (ref 27–31)
MCHC RBC AUTO-ENTMCNC: 34 G/DL (ref 31–36)
MCV RBC AUTO: 95 FL (ref 80–94)
MONOCYTES # BLD AUTO: 0.4 10^3/UL (ref 0–0.8)
NEUTROPHILS # BLD AUTO: 6.1 10^3/UL (ref 1.5–7.7)
NRBC # BLD AUTO: 0 10^3/UL
NRBC BLD QL AUTO: 0.1
PLATELET # BLD AUTO: 280 10^3/UL (ref 150–450)
POTASSIUM SERPL-SCNC: 4.9 MMOL/L (ref 3.5–5)
RBC # BLD AUTO: 4.9 10^6 /UL (ref 4.18–5.48)
RBC UR QL AUTO: (no result)
SODIUM SERPL-SCNC: 140 MMOL/L (ref 135–145)
WBC # BLD AUTO: 7.7 10^3/UL (ref 3.5–10.8)
WBC UR QL AUTO: (no result)

## 2020-02-12 PROCEDURE — 87077 CULTURE AEROBIC IDENTIFY: CPT

## 2020-02-12 PROCEDURE — 80048 BASIC METABOLIC PNL TOTAL CA: CPT

## 2020-02-12 PROCEDURE — 87086 URINE CULTURE/COLONY COUNT: CPT

## 2020-02-12 PROCEDURE — 81003 URINALYSIS AUTO W/O SCOPE: CPT

## 2020-02-12 PROCEDURE — 87186 SC STD MICRODIL/AGAR DIL: CPT

## 2020-02-12 PROCEDURE — 99283 EMERGENCY DEPT VISIT LOW MDM: CPT

## 2020-02-12 PROCEDURE — 36415 COLL VENOUS BLD VENIPUNCTURE: CPT

## 2020-02-12 PROCEDURE — 85025 COMPLETE CBC W/AUTO DIFF WBC: CPT

## 2020-02-12 PROCEDURE — 81015 MICROSCOPIC EXAM OF URINE: CPT

## 2020-02-12 PROCEDURE — 51702 INSERT TEMP BLADDER CATH: CPT

## 2020-02-12 NOTE — ED
GI/ HPI





- HPI Summary


HPI Summary: 


This patient is an 79 year old male presenting to [facility] with a chief complaint 

of urinary urgency. Patient states he has a chronic indwelling catheter, 

reports penile pain which patient suspects is due to Bagley obstructions. He 

states he has had this catheter for 5 weeks. He sees Dr. Muhammad to manage his 

urology problems, and has an appointment scheduled in 4 days for replacement. 

Medications reviewed, allergies noted. 





- History of Current Complaint


Chief Complaint: EDUrogenitalProblems


Time Seen by Provider: 02/12/20 07:09


Stated Complaint: UNABLE TO GO TO BATHROOM


Hx Obtained From: Patient


Onset/Duration: Started Hours Ago


Pain Intensity: 10





- Additional Pertinent History


Primary Care Physician: FELIX





- Allergy/Home Medications


Allergies/Adverse Reactions: 


 Allergies











Allergy/AdvReac Type Severity Reaction Status Date / Time


 


No Known Allergies Allergy   Verified 02/12/20 06:46











Home Medications: 


 Home Medications





Tamsulosin CAP* [Flomax CAP*] 0.4 mg PO DAILY 02/12/20 [History Confirmed 02/12/ 20]











PMH/Surg Hx/FS Hx/Imm Hx


Endocrine/Hematology History: 


   Denies: Hx Anticoagulant Therapy, Hx Diabetes, Hx Thyroid Disease, Hx Anemia


Cardiovascular History: Reports: Hx Coronary Artery Disease, Hx 

Hypercholesterolemia, Hx Myocardial Infarction


   Denies: Hx Angina, Hx Hypertension, Hx Pacemaker/ICD, Hx Peripheral Vascular 

Disease, Hx Syncope


   Comment Only: Other Cardiovascular Problems/Disorders - MI


Respiratory History: 


   Denies: Hx Asthma, Hx Chronic Obstructive Pulmonary Disease (COPD), Hx Lung 

Cancer, Hx Pulmonary Embolism


GI History: Reports: Other GI Disorders - peptic ulcer disease


   Denies: Hx Gastroesophageal Reflux Disease


 History: Reports: Hx Benign Prostatic Hyperplasia


   Denies: Hx Renal Disease


   Comment Only: Other  Problems/Disorders - urine retention


Musculoskeletal History: Reports: Hx Back Problems


Sensory History: Reports: Hx Contacts or Glasses, Hx Legally Blind - Rt eye, Hx 

Vision Problem - R eye blindness, Hx Hearing Problem


   Denies: Hx Cataracts, Hx Eye Injury, Hx Eye Prosthesis, Hx Glaucoma, Hx 

Macular Degeneration, Hx Deafness, Hx Hearing Aid, Other Sensory Impairments


Opthamlomology History: Reports: Hx Contacts or Glasses, Hx Legally Blind - Rt 

eye, Hx Vision Problem - R eye blindness


   Denies: Hx Cataracts, Hx Eye Injury, Hx Eye Prosthesis, Hx Glaucoma, Hx 

Macular Degeneration, Other Sensory Impairments


Neurological History: Reports: Hx Headaches


   Denies: Hx Dementia, Hx Developmental Delay, Hx Migraine, Hx Nerve Disease, 

Hx Seizures, Hx Spinal Cord Injury, Hx Transient Ischemic Attacks (TIA), Other 

Neuro Impairments/Disorders


Psychiatric History: 


   Denies: Hx Panic Disorder





- Surgical History


Surgery Procedure, Year, and Place: MI WITH STENT.  MULTIPLE HERNIA REPAIRS, 

left side rib repair,.  right femur rodding


Hx Anesthesia Reactions: No


Infectious Disease History: No


Infectious Disease History: 


   Denies: Hx Clostridium Difficile, Hx Hepatitis, Hx Human Immunodeficiency 

Virus (HIV), Hx of Known/Suspected MRSA, Hx Shingles, Hx Tuberculosis, Hx Known/

Suspected VRE, Traveled Outside the [place] in Last 30 Days





- Family History


Known Family History: Positive: Cardiac Disease, Hypertension





- Social History


Alcohol Use: None


Hx Substance Use: No


Substance Use Type: Reports: None


Hx Tobacco Use: No


Smoking Status (MU): Never Smoked Tobacco





Review of Systems


Negative: Fever


Positive: pain - Penile, urgency


All Other Systems Reviewed And Are Negative: Yes





Physical Exam





- Summary


Physical Exam Summary: 





Constitutional: Well-developed, Well-nourished, Alert. (-) Distressed


Skin: Warm, Dry


HENT: Normocephalic; Atraumatic


Eyes: Conjunctiva normal


Neck: Musculoskeletal ROM normal neck. (-) JVD, (-) Stridor, (-) Tracheal 

deviation


Cardio: Rhythm regular, rate normal, Heart sounds normal; Intact distal pulses; 

The pedal pulses are 2+ and symmetric. Radial pulses are 2+ and symmetric. (-) 

Murmur


Pulmonary/Chest wall: Effort normal. (-) Respiratory distress, (-) Wheezes, (-) 

Rales


Abd: Soft, (-) tenderness, (-) Distension, (-) Guarding, (-) Rebound


Musculoskeletal: (-) Edema


Lymph: (-) Cervical adenopathy


Neuro: Alert, Oriented x3


Psych: Mood and affect Normal





Triage Information Reviewed: Yes


Vital Signs On Initial Exam: 


 Initial Vitals











Temp Pulse Resp BP Pulse Ox


 


 96.9 F   77   18   196/124   98 


 


 02/12/20 06:48  02/12/20 06:48  02/12/20 06:48  02/12/20 06:48  02/12/20 06:48











Vital Signs Reviewed: Yes





Procedures





- Sedation


Patient Received Moderate/Deep Sedation with Procedure: No





Diagnostics





- Vital Signs


 Vital Signs











  Temp Pulse Resp BP Pulse Ox


 


 02/12/20 06:48  96.9 F  77  18  196/124  98














- Laboratory


Result Diagrams: 


 02/12/20 08:56





 02/12/20 08:56


Lab Statement: Any lab studies that have been ordered have been reviewed, and 

results considered in the medical decision making process.





GIGU Course/Dx





- Course


Course Of Treatment: Patient is here with a clogged Bagley catheter.  Patient's 

had his catheter and for 5 weeks and is scheduled to get it changed on Friday.  

We initially attempted to clear the catheter but was unsuccessful.  The balloon 

was then deflated and rash was applied to release the catheter but was 

unsuccessful.  Urology was called and he suggested cutting the balloon port.  

Patient did had a performed, stood up, and started screaming in pain and his 

catheter popped out on its own.  Patient had successful replacement of his 

catheter and will follow up with Dr. Muhammad on Friday





- Diagnoses


Provider Diagnoses: 


 Bagley catheter problem








Discharge ED





- Sign-Out/Discharge


Documenting (check all that apply): Patient Departure - Discharge





- Discharge Plan


Condition: Stable


Disposition: HOME


Patient Education Materials:  Bagley Catheter Placement and Care (ED)


Referrals: 


Bruce Woods MD [Primary Care Provider] - 


Additional Instructions: 


Follow up with  on Friday. Return with Fever, chills, your catheter 

stops working, or pain on your sides. 





- Billing Disposition and Condition


Condition: STABLE


Disposition: Home





- Attestation Statements


Document Initiated by Angie: Yes


Documenting Gwenibe: Theron Garvey


Provider For Whom Angie is Documenting (Include Credential): Jerry Flynn MD


Scribe Attestation: 


Theron NUNO, scribed for Jerry Flynn MD on 02/12/20 at 1049. 


Scribe Documentation Reviewed: Yes


Provider Attestation: 


The documentation as recorded by the Theron pa accurately 

reflects the service I personally performed and the decisions made by me, Jerry Flynn MD


Status of Scribe Document: Viewed

## 2020-02-14 NOTE — ED
Imaging and Labs Follow Up


Follow Up Type: Labs/Cultures


Labs/Culture Result: 





Urine culture preliminary shows >100,000 Proteus Mirabilis.


Patient Communication/Plan: 


awaiting sensitivities. Pt is to follow up with urology today.





Provider Diagnoses: 


 Bagley catheter problem

## 2020-03-22 ENCOUNTER — HOSPITAL ENCOUNTER (EMERGENCY)
Dept: HOSPITAL 25 - ED | Age: 80
Discharge: HOME | End: 2020-03-22
Payer: MEDICARE

## 2020-03-22 VITALS — SYSTOLIC BLOOD PRESSURE: 157 MMHG | DIASTOLIC BLOOD PRESSURE: 123 MMHG

## 2020-03-22 DIAGNOSIS — I25.10: ICD-10-CM

## 2020-03-22 DIAGNOSIS — I25.2: ICD-10-CM

## 2020-03-22 DIAGNOSIS — K27.9: ICD-10-CM

## 2020-03-22 DIAGNOSIS — N40.0: ICD-10-CM

## 2020-03-22 DIAGNOSIS — Z95.5: ICD-10-CM

## 2020-03-22 DIAGNOSIS — T83.098A: Primary | ICD-10-CM

## 2020-03-22 DIAGNOSIS — R51: ICD-10-CM

## 2020-03-22 DIAGNOSIS — E78.00: ICD-10-CM

## 2020-03-22 DIAGNOSIS — Z79.899: ICD-10-CM

## 2020-03-22 DIAGNOSIS — Z79.82: ICD-10-CM

## 2020-03-22 PROCEDURE — 99282 EMERGENCY DEPT VISIT SF MDM: CPT
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
## 2020-03-22 NOTE — ED
GI/ HPI





- History of Current Complaint


Chief Complaint: EDUrogenitalProblems


Time Seen by Provider: 03/22/20 00:50


Stated Complaint: CATHER BLOCKAGE PER EMS


Pain Intensity: 0





- Additional Pertinent History


Primary Care Physician: [id number]





- Allergy/Home Medications


Allergies/Adverse Reactions: 


 Allergies











Allergy/AdvReac Type Severity Reaction Status Date / Time


 


No Known Allergies Allergy   Verified 02/12/20 06:46











Home Medications: 


 Home Medications





Aspirin 81 mg CHEW TAB* 81 mg PO DAILY 03/28/15 [History Confirmed 02/12/20]


Pilocarpine 1% OPTH.SOL* 1 drop LEFT EYE .EVERY 30 DAYS 03/28/15 [History 

Confirmed 02/12/20]


Gabapentin 1,200 mg PO BID 09/08/18 [History Confirmed 02/12/20]


Timolol 0.25% OPHTH.SOLN* [Timoptic Ophth.soln 0.25%*] 1 drop RIGHT EYE Q12HR 09

/10/18 [History Confirmed 02/12/20]


Acetaminophen [Tylenol Extra Strength] 500 mg PO BEDTIME 11/11/19 [History 

Confirmed 02/12/20]


Cyanocobalamin TAB* [Vitamin B12 TAB*] 1,000 mcg PO BID 11/11/19 [History 

Confirmed 02/12/20]


Metoprolol Succinate XL TAB* [Toprol XL TAB*] 25 mg PO DAILY 11/11/19 [History 

Confirmed 02/12/20]


Travoprost 0.004% (NF) [Travatan Z (NF)] 1 drop RIGHT EYE QPM 11/11/19 [History 

Confirmed 02/12/20]


Tamsulosin CAP* [Flomax CAP*] 0.4 mg PO DAILY 02/12/20 [History Confirmed 02/12/ 20]











PMH/Surg Hx/FS Hx/Imm Hx


Endocrine/Hematology History: 


   Denies: Hx Anticoagulant Therapy, Hx Diabetes, Hx Thyroid Disease, Hx Anemia


Cardiovascular History: Reports: Hx Coronary Artery Disease, Hx 

Hypercholesterolemia, Hx Myocardial Infarction


   Denies: Hx Angina, Hx Hypertension, Hx Pacemaker/ICD, Hx Peripheral Vascular 

Disease, Hx Syncope


   Comment Only: Other Cardiovascular Problems/Disorders - MI


Respiratory History: 


   Denies: Hx Asthma, Hx Chronic Obstructive Pulmonary Disease (COPD), Hx Lung 

Cancer, Hx Pulmonary Embolism


GI History: Reports: Other GI Disorders - peptic ulcer disease


   Denies: Hx Gastroesophageal Reflux Disease


 History: Reports: Hx Benign Prostatic Hyperplasia


   Denies: Hx Renal Disease


   Comment Only: Other  Problems/Disorders - urine retention


Musculoskeletal History: Reports: Hx Back Problems


Sensory History: Reports: Hx Contacts or Glasses, Hx Legally Blind - Rt eye, Hx 

Vision Problem - R eye blindness, Hx Hearing Problem


   Denies: Hx Cataracts, Hx Eye Injury, Hx Eye Prosthesis, Hx Glaucoma, Hx 

Macular Degeneration, Hx Deafness, Hx Hearing Aid, Other Sensory Impairments


Opthamlomology History: Reports: Hx Contacts or Glasses, Hx Legally Blind - Rt 

eye, Hx Vision Problem - R eye blindness


   Denies: Hx Cataracts, Hx Eye Injury, Hx Eye Prosthesis, Hx Glaucoma, Hx 

Macular Degeneration, Other Sensory Impairments


Neurological History: Reports: Hx Headaches


   Denies: Hx Dementia, Hx Developmental Delay, Hx Migraine, Hx Nerve Disease, 

Hx Seizures, Hx Spinal Cord Injury, Hx Transient Ischemic Attacks (TIA), Other 

Neuro Impairments/Disorders


Psychiatric History: 


   Denies: Hx Panic Disorder





- Surgical History


Surgery Procedure, Year, and Place: MI WITH STENT.  MULTIPLE HERNIA REPAIRS, 

left side rib repair,.  right femur rodding


Hx Anesthesia Reactions: No


Infectious Disease History: No


Infectious Disease History: 


   Denies: Hx Clostridium Difficile, Hx Hepatitis, Hx Human Immunodeficiency 

Virus (HIV), Hx of Known/Suspected MRSA, Hx Shingles, Hx Tuberculosis, Hx Known/

Suspected VRE, Traveled Outside the [place] in Last 30 Days





- Family History


Known Family History: Positive: Cardiac Disease, Hypertension





- Social History


Alcohol Use: None


Hx Substance Use: No


Substance Use Type: Reports: None


Hx Tobacco Use: No


Smoking Status (MU): Never Smoked Tobacco





Physical Exam


Vital Signs On Initial Exam: 


 Initial Vitals











Temp Pulse Resp BP Pulse Ox


 


 97.8 F   74   20   169/105   94 


 


 03/22/20 00:48  03/22/20 00:48  03/22/20 00:48  03/22/20 00:48  03/22/20 00:48














Diagnostics





- Vital Signs


 Vital Signs











  Temp Pulse Resp BP Pulse Ox


 


 03/22/20 00:48  97.8 F  74  20  169/105  94














- Laboratory


Lab Statement: Any lab studies that have been ordered have been reviewed, and 

results considered in the medical decision making process.





Discharge ED





- Discharge Plan


Referrals: 


Bruce Woods MD [Primary Care Provider] -

## 2020-03-22 NOTE — ED
GI/ HPI





- HPI Summary


HPI Summary: 


79 year old M presenting to [facility] via EMS with a chief complaint of a clogged 

catheter since 2130 on 03/21/2020. The patient states seeing red, sand-like 

particles in his catheter. The patient states that he is due for a catheter 

change next week, which will be his 4th week with his current catheter. He 

states that he has a catheter due to an enlarged prostate, preventing him from 

urinating normally. He states that he has some pain in his bladder area. 

Patient denies N/V and denies a fever. He has a history of abdominal surgeries 

done in the 1940s. 





 Home Medications











 Medication  Instructions  Recorded  Confirmed  Type


 


Aspirin 81 mg CHEW TAB* 81 mg PO DAILY 03/28/15 02/12/20 History


 


Pilocarpine 1% OPTH.SOL* 1 drop LEFT EYE .EVERY 30 DAYS 03/28/15 02/12/20 

History


 


Gabapentin 1,200 mg PO BID 09/08/18 02/12/20 History


 


Timolol 0.25% OPHTH.SOLN* 1 drop RIGHT EYE Q12HR 09/10/18 02/12/20 History





[Timoptic Ophth.soln 0.25%*]    


 


Acetaminophen [Tylenol Extra 500 mg PO BEDTIME 11/11/19 02/12/20 History





Strength]    


 


Cyanocobalamin TAB* [Vitamin B12 1,000 mcg PO BID 11/11/19 02/12/20 History





TAB*]    


 


Metoprolol Succinate XL TAB* 25 mg PO DAILY 11/11/19 02/12/20 History





[Toprol XL TAB*]    


 


Travoprost 0.004% (NF) [Travatan Z 1 drop RIGHT EYE QPM 11/11/19 02/12/20 

History





(NF)]    


 


Tamsulosin CAP* [Flomax CAP*] 0.4 mg PO DAILY 02/12/20 02/12/20 History














- History of Current Complaint


Chief Complaint: EDUrogenitalProblems


Time Seen by Provider: 03/22/20 00:50


Stated Complaint: CATHETER BLOCKAGE PER EMS


Hx Obtained From: Patient


Timing: Lasting Hours - Since 2130 on 03/21/2020.


Pain Intensity: 0


Location of Pain: Suprapubic - Patient states he has some pain in his bladder 

area.


Associated Signs and Symptoms: Positive: Other: - Patient states seeing red sand

-like particles in his catheter.





- Additional Pertinent History


Primary Care Physician: [id number]





- Allergy/Home Medications


Allergies/Adverse Reactions: 


 Allergies











Allergy/AdvReac Type Severity Reaction Status Date / Time


 


No Known Allergies Allergy   Verified 02/12/20 06:46











Home Medications: 


 Home Medications





Aspirin 81 mg CHEW TAB* 81 mg PO DAILY 03/28/15 [History Confirmed 02/12/20]


Pilocarpine 1% OPTH.SOL* 1 drop LEFT EYE .EVERY 30 DAYS 03/28/15 [History 

Confirmed 02/12/20]


Gabapentin 1,200 mg PO BID 09/08/18 [History Confirmed 02/12/20]


Timolol 0.25% OPHTH.SOLN* [Timoptic Ophth.soln 0.25%*] 1 drop RIGHT EYE Q12HR 09

/10/18 [History Confirmed 02/12/20]


Acetaminophen [Tylenol Extra Strength] 500 mg PO BEDTIME 11/11/19 [History 

Confirmed 02/12/20]


Cyanocobalamin TAB* [Vitamin B12 TAB*] 1,000 mcg PO BID 11/11/19 [History 

Confirmed 02/12/20]


Metoprolol Succinate XL TAB* [Toprol XL TAB*] 25 mg PO DAILY 11/11/19 [History 

Confirmed 02/12/20]


Travoprost 0.004% (NF) [Travatan Z (NF)] 1 drop RIGHT EYE QPM 11/11/19 [History 

Confirmed 02/12/20]


Tamsulosin CAP* [Flomax CAP*] 0.4 mg PO DAILY 02/12/20 [History Confirmed 02/12/ 20]











PMH/Surg Hx/FS Hx/Imm Hx


Endocrine/Hematology History: 


   Denies: Hx Anticoagulant Therapy, Hx Diabetes, Hx Thyroid Disease, Hx Anemia


Cardiovascular History: Reports: Hx Coronary Artery Disease, Hx 

Hypercholesterolemia, Hx Myocardial Infarction


   Denies: Hx Angina, Hx Hypertension, Hx Pacemaker/ICD, Hx Peripheral Vascular 

Disease, Hx Syncope


   Comment Only: Other Cardiovascular Problems/Disorders - MI


Respiratory History: 


   Denies: Hx Asthma, Hx Chronic Obstructive Pulmonary Disease (COPD), Hx Lung 

Cancer, Hx Pulmonary Embolism


GI History: Reports: Other GI Disorders - peptic ulcer disease


   Denies: Hx Gastroesophageal Reflux Disease


 History: Reports: Hx Benign Prostatic Hyperplasia


   Denies: Hx Renal Disease


   Comment Only: Other  Problems/Disorders - urine retention


Musculoskeletal History: Reports: Hx Back Problems


Sensory History: Reports: Hx Contacts or Glasses, Hx Legally Blind - Rt eye, Hx 

Vision Problem - R eye blindness, Hx Hearing Problem


   Denies: Hx Cataracts, Hx Eye Injury, Hx Eye Prosthesis, Hx Glaucoma, Hx 

Macular Degeneration, Hx Deafness, Hx Hearing Aid, Other Sensory Impairments


Opthamlomology History: Reports: Hx Contacts or Glasses, Hx Legally Blind - Rt 

eye, Hx Vision Problem - R eye blindness


   Denies: Hx Cataracts, Hx Eye Injury, Hx Eye Prosthesis, Hx Glaucoma, Hx 

Macular Degeneration, Other Sensory Impairments


Neurological History: Reports: Hx Headaches


   Denies: Hx Dementia, Hx Developmental Delay, Hx Migraine, Hx Nerve Disease, 

Hx Seizures, Hx Spinal Cord Injury, Hx Transient Ischemic Attacks (TIA), Other 

Neuro Impairments/Disorders


Psychiatric History: 


   Denies: Hx Panic Disorder





- Surgical History


Surgery Procedure, Year, and Place: MI WITH STENT.  MULTIPLE HERNIA REPAIRS, 

left side rib repair,.  right femur rodding


Hx Anesthesia Reactions: No


Infectious Disease History: No


Infectious Disease History: 


   Denies: Hx Clostridium Difficile, Hx Hepatitis, Hx Human Immunodeficiency 

Virus (HIV), Hx of Known/Suspected MRSA, Hx Shingles, Hx Tuberculosis, Hx Known/

Suspected VRE, Traveled Outside the [place] in Last 30 Days





- Family History


Known Family History: Positive: Cardiac Disease, Hypertension





- Social History


Alcohol Use: None


Hx Substance Use: No


Substance Use Type: Reports: None


Hx Tobacco Use: No


Smoking Status (MU): Never Smoked Tobacco





Review of Systems


Negative: Fever


Negative: Vomiting, Nausea


Genitourinary: Other - positive - clogged catheter


Positive: pain - Some suprapubic pain. 


All Other Systems Reviewed And Are Negative: Yes





Physical Exam





- Summary


Physical Exam Summary: 


Constitutional: Well-developed, Well-nourished, Alert. (-) Distressed


Skin: Warm, Dry


HENT: Normocephalic; Atraumatic


Eyes: Conjunctiva normal


Neck: Musculoskeletal ROM normal neck. (-) JVD, (-) Stridor, (-) Tracheal 

deviation


Cardio: Rhythm regular, rate normal, Heart sounds normal; Intact distal pulses; 

The pedal pulses are 2+ and symmetric. Radial pulses are 2+ and symmetric. (-) 

Murmur


Pulmonary/Chest wall: Effort normal. (-) Respiratory distress, (-) Wheezes, (-) 

Rales


Abd: Soft, (+) mild suprapubic tenderness, (-) Distension, (-) Guarding, (-) 

Rebound. Clogged catheter noted. 


Musculoskeletal: (-) Edema


Lymph: (-) Cervical adenopathy


Neuro: Alert, Oriented x3


Psych: Mood and affect Normal








Triage Information Reviewed: Yes


Vital Signs On Initial Exam: 


 Initial Vitals











Temp Pulse Resp BP Pulse Ox


 


 97.8 F   74   20   169/105   94 


 


 03/22/20 00:48  03/22/20 00:48  03/22/20 00:48  03/22/20 00:48  03/22/20 00:48











Vital Signs Reviewed: Yes





Procedures





- Sedation


Patient Received Moderate/Deep Sedation with Procedure: No





Diagnostics





- Vital Signs


 Vital Signs











  Temp Pulse Resp BP Pulse Ox


 


 03/22/20 00:48  97.8 F  74  20  169/105  94














- Laboratory


Lab Statement: Any lab studies that have been ordered have been reviewed, and 

results considered in the medical decision making process.





GIGU Course/Dx





- Course


Course Of Treatment: Patient presented to [facility] with a chief complaint of a 

clogged catheter since 2130 on 03/21/2020. He states seeing red sand like 

particles in his catheter. The patient states that he is due for a catheter 

change next week, which will be his 4th week with his current catheter. He 

states that he has a catheter due to an enlarged prostate, preventing him from 

urinating normally. He states that he has some pain in his bladder. The patient 

was positive for mild suprapubic tenderness. The patient was provided with a 

catheter replacement and patient education on how to care for a rocha catheter. 

He was discharged to home with a primary care physician followup within 3 days.





- Diagnoses


Provider Diagnoses: 


 Encounter for Rocha catheter replacement








Discharge ED





- Sign-Out/Discharge


Documenting (check all that apply): Patient Departure





- Discharge Plan


Condition: Stable


Disposition: HOME


Patient Education Materials:  Rocha Catheter Placement and Care (ED)


Referrals: 


Bruce Woods MD [Primary Care Provider] - 3 Days


Additional Instructions: 


Please return to the ED for any new or worsening symptoms. Please followup with 

primary care physician within 3 days. 





- Billing Disposition and Condition


Condition: STABLE


Disposition: Home





- Attestation Statements


Document Initiated by Scribe: Yes


Documenting Scribe: Irina Diaz and Liban Ashby


Provider For Whom Scribe is Documenting (Include Credential): Mark Kapoor, 


Scribe Attestation: 


I, Irina Diaz and Liban Ashby, scribed for Mark Kapoor DO on 03/22/ 20 at 0402. 


Scribe Documentation Reviewed: Yes


Provider Attestation: 


The documentation as recorded by the scribe, Irina Diaz and Liban Ashby 

accurately reflects the service I personally performed and the decisions made 

by me, Mark Kapoor DO


Status of Scribe Document: Viewed

## 2020-07-08 NOTE — ED
Nausea/Vomiting/Diarrhea HPI





- HPI Summary


HPI Summary: 


This patient is an 79-year-old fairly otherwise healthy male with history of 

mild dementia ascending to the ED with multiple complaints.  On arrival, he 

states he only has symptoms of nausea since about midnight.  He denies any 

abdominal pain, urinary symptoms, CP or SOB.  He denied any other symptoms on 

arrival, however later changed his story and states he was here for feeling of 

"pins and needles" his bilateral cheeks and forehead.  Continues to endorse 

nausea, however does not complain of this, only when asked.  Denies any fevers, 

sweats, chills.  He states he does not sleep at night and would like to be 

admitted to the hospital.  When asked why he would like to be admitted, he 

states he would like to know "what's going on."  He states he has been having 

the symptoms for several months and has been seen here 6-8 times.  When asked 

if he continues to have nausea, he acts confused and states he is here for his 

pins and needles although to EMS, he only had a chief complaint of nausea.  

Denies any vomiting, constipation or diarrhea.








- History of Current Complaint


Chief Complaint: EDNauseaVomitDiarrh


Stated Complaint: NAUSEA PER EMS


Time Seen by Provider: 12/26/19 07:48


Hx Obtained From: Patient


Onset/Duration: Gradual Onset


Timing: Constant


Severity Initially: Mild


Severity Currently: Mild


Pain Intensity: 3


Pain Scale Used: 0-10 Numeric





- Allergies/Home Medications


Allergies/Adverse Reactions: 


 Allergies











Allergy/AdvReac Type Severity Reaction Status Date / Time


 


No Known Allergies Allergy   Verified 11/11/19 12:45














PMH/Surg Hx/FS Hx/Imm Hx


Previously Healthy: Yes


Endocrine/Hematology History: 


   Denies: Hx Anticoagulant Therapy, Hx Diabetes, Hx Thyroid Disease, Hx Anemia


Cardiovascular History: Reports: Hx Coronary Artery Disease, Hx 

Hypercholesterolemia, Hx Myocardial Infarction


   Denies: Hx Angina, Hx Hypertension, Hx Pacemaker/ICD, Hx Peripheral Vascular 

Disease, Hx Syncope


   Comment Only: Other Cardiovascular Problems/Disorders - MI


Respiratory History: 


   Denies: Hx Asthma, Hx Chronic Obstructive Pulmonary Disease (COPD), Hx Lung 

Cancer, Hx Pulmonary Embolism


GI History: Reports: Other GI Disorders - peptic ulcer disease


   Denies: Hx Gastroesophageal Reflux Disease


 History: Reports: Hx Benign Prostatic Hyperplasia


   Denies: Hx Renal Disease


   Comment Only: Other  Problems/Disorders - urine retention


Musculoskeletal History: Reports: Hx Back Problems


Sensory History: Reports: Hx Contacts or Glasses, Hx Legally Blind - Rt eye, Hx 

Vision Problem - R eye blindness, Hx Hearing Problem


   Denies: Hx Cataracts, Hx Eye Injury, Hx Eye Prosthesis, Hx Glaucoma, Hx 

Macular Degeneration, Hx Deafness, Hx Hearing Aid, Other Sensory Impairments


Opthamlomology History: Reports: Hx Contacts or Glasses, Hx Legally Blind - Rt 

eye, Hx Vision Problem - R eye blindness


   Denies: Hx Cataracts, Hx Eye Injury, Hx Eye Prosthesis, Hx Glaucoma, Hx 

Macular Degeneration, Other Sensory Impairments


Neurological History: Reports: Hx Headaches


   Denies: Hx Dementia, Hx Developmental Delay, Hx Migraine, Hx Nerve Disease, 

Hx Seizures, Hx Spinal Cord Injury, Hx Transient Ischemic Attacks (TIA), Other 

Neuro Impairments/Disorders


Psychiatric History: 


   Denies: Hx Panic Disorder





- Surgical History


Surgery Procedure, Year, and Place: MI WITH STENT.  MULTIPLE HERNIA REPAIRS, 

left side rib repair,.  right femur rodding


Hx Anesthesia Reactions: No





- Immunization History


Hx Pertussis Vaccination: No


Immunizations Up to Date: Yes


Infectious Disease History: No


Infectious Disease History: 


   Denies: Hx Clostridium Difficile, Hx Hepatitis, Hx Human Immunodeficiency 

Virus (HIV), Hx of Known/Suspected MRSA, Hx Shingles, Hx Tuberculosis, Hx Known/

Suspected VRE, Traveled Outside the [place] in Last 30 Days





- Family History


Known Family History: Positive: Cardiac Disease, Hypertension





- Social History


Occupation: Unemployed


Lives: [facility]


Alcohol Use: None


Hx Substance Use: No


Substance Use Type: Reports: None


Hx Tobacco Use: No


Smoking Status (MU): Never Smoked Tobacco





Review of Systems


Negative: Fever, Chills, Fatigue, Skin Diaphoresis


Negative: Palpitations, Chest Pain


Negative: Shortness Of Breath, Cough


Positive: Nausea


Positive: no symptoms reported, see HPI


Negative: Arthralgia, Myalgia


Positive: Paresthesia - to face


Psychological: Normal


All Other Systems Reviewed And Are Negative: Yes





Physical Exam


Triage Information Reviewed: Yes


Vital Signs On Initial Exam: 


 Initial Vitals











Pulse BP Pulse Ox


 


 74   173/119   97 


 


 12/26/19 07:54  12/26/19 07:54  12/26/19 07:54











Vital Signs Reviewed: Yes


Appearance: Positive: Well-Appearing, Well-Nourished


Skin: Positive: Warm, Skin Color Reflects Adequate Perfusion


Neck: Positive: Supple, Nontender, No Lymphadenopathy


Respiratory/Lung Sounds: Positive: Clear to Auscultation, Breath Sounds Present


Cardiovascular: Positive: RRR


Musculoskeletal: Positive: Strength/ROM Intact


Neurological: Positive: Speech Normal


Psychiatric: Positive: Normal, Affect/Mood Appropriate





Procedures





- Sedation


Patient Received Moderate/Deep Sedation with Procedure: No





Diagnostics





- Vital Signs


 Vital Signs











  Temp Pulse Resp BP Pulse Ox


 


 12/26/19 12:40  99.6 F  99  16  156/103  94


 


 12/26/19 12:34     156/103 


 


 12/26/19 12:26   26    81


 


 12/26/19 12:23     158/108 


 


 12/26/19 11:53     171/108 


 


 12/26/19 11:23   87   149/101  96


 


 12/26/19 11:16   84    95


 


 12/26/19 10:53   90   179/101  94


 


 12/26/19 10:23   91   169/98  98


 


 12/26/19 10:00   82    96


 


 12/26/19 09:53   81   158/109  93


 


 12/26/19 09:23   73   162/103  97


 


 12/26/19 09:00   73    98


 


 12/26/19 08:53   73   169/103  99


 


 12/26/19 08:23   72   165/109  98


 


 12/26/19 08:06   77    97


 


 12/26/19 07:57  99.6 F  74  18  173/119  98


 


 12/26/19 07:54   74   173/119  97














- Laboratory


Lab Results: 


 Lab Results











  12/26/19 12/26/19 Range/Units





  09:08 09:08 


 


WBC  7.3   (3.5-10.8)  10^3/uL


 


RBC  4.83   (4.18-5.48)  10^6 /uL


 


Hgb  15.4   (14.0-18.0)  g/dL


 


Hct  44   (42-52)  %


 


MCV  92   (80-94)  fL


 


MCH  32 H   (27-31)  pg


 


MCHC  35   (31-36)  g/dL


 


RDW  14   (10-15)  %


 


Plt Count  257   (150-450)  10^3/uL


 


MPV  7.5   (7.4-10.4)  fL


 


Neut % (Auto)  69.5   %


 


Lymph % (Auto)  21.6   %


 


Mono % (Auto)  8.0   %


 


Eos % (Auto)  0.3   %


 


Baso % (Auto)  0.6   %


 


Absolute Neuts (auto)  5.0   (1.5-7.7)  10^3/ul


 


Absolute Lymphs (auto)  1.6   (1.0-4.8)  10^3/ul


 


Absolute Monos (auto)  0.6   (0-0.8)  10^3/ul


 


Absolute Eos (auto)  0.0   (0-0.6)  10^3/ul


 


Absolute Basos (auto)  0.0   (0-0.2)  10^3/ul


 


Absolute Nucleated RBC  0.0   10^3/ul


 


Nucleated RBC %  0.0   


 


Sodium   136  (135-145)  mmol/L


 


Potassium   4.7  (3.5-5.0)  mmol/L


 


Chloride   102  (101-111)  mmol/L


 


Carbon Dioxide   24  (22-32)  mmol/L


 


Anion Gap   10  (2-11)  mmol/L


 


BUN   13  (6-24)  mg/dL


 


Creatinine   0.97  (0.67-1.17)  mg/dL


 


Est GFR ( Amer)   89.9  (>60)  


 


Est GFR (Non-Af Amer)   74.3  (>60)  


 


BUN/Creatinine Ratio   13.4  (8-20)  


 


Glucose   99  ()  mg/dL


 


Calcium   10.1  (8.6-10.3)  mg/dL


 


Total Bilirubin   3.50 H  (0.2-1.0)  mg/dL


 


AST   13  (13-39)  U/L


 


ALT   12  (7-52)  U/L


 


Alkaline Phosphatase   92  ()  U/L


 


Total Protein   7.4  (6.4-8.9)  g/dL


 


Albumin   4.3  (3.2-5.2)  g/dL


 


Globulin   3.1  (2-4)  g/dL


 


Albumin/Globulin Ratio   1.4  (1-3)  











Result Diagrams: 


 12/26/19 09:08





 12/26/19 09:08


Lab Statement: Any lab studies that have been ordered have been reviewed, and 

results considered in the medical decision making process.





Naus/Vom/Diarrhea Course/Dx





- Course


Course Of Treatment: Patient was given Zofran on arrival for his nausea.  He 

states did not improve his symptoms and he was subsequently given Reglan.  At 

this time as he continues to be nauseous, labs are obtained, these are WNL.  No 

abdominal tenderness throughout on physical exam.  Lungs CTA, RRR.  Patient 

appears well in no acute distress.  He is requesting to be admitted to the 

hospital.  When asked why he would like to be admitted, he states he wants to 

know "what's going on."  It is very difficult to obtain from the patient 

exactly the nature of his symptoms and why he is here in the hospital.  When 

asked if his nausea has improved, he states he has no nausea, and is angry that 

we continue to ask about his nausea. (this was CC on arrival).  Patient appears 

that he would just like to be admitted for the hospital for nonspecific reason.

  He does complain of "pins and needles" to his bilateral cheeks and forehead, 

however these have been present times approximately 3 months.  A CT brain was 

obtained which shows no acute findings.  Again, discussed with the patient 

discharging home, to which he becomes upset.  He states "I'll just come back."  

I have placed a SW consult and he is agrreable to leave at this time. NAD.  VS 

stable.





- Differential Dx/Diagnosis


Provider Diagnosis: 


 Facial paresthesia, Nausea





Condition At Discharge: Stable





Discharge ED





- Sign-Out/Discharge


Documenting (check all that apply): Patient Departure





- Discharge Plan


Condition: Stable


Disposition: HOME


Referrals: 


Bruce Woods MD [Primary Care Provider] - 


Additional Instructions: 


Please follow up with PCP and neurology if symptoms persist





- Billing Disposition and Condition


Condition: STABLE


Disposition: Home





- Attestation Statements


Provider Attestation: 





 I was available for consultation for this patient. I did not evaluate the 

patient or participate in any medical decision making or disposition decisions 

unless I am specifically named in the chart as having consulted on the patient. 

If I have consulted on the patient, please see my own ED note on the patient 

encounter. Nini De La Torre MD
10
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
